# Patient Record
Sex: FEMALE | Race: WHITE | NOT HISPANIC OR LATINO | Employment: PART TIME | URBAN - METROPOLITAN AREA
[De-identification: names, ages, dates, MRNs, and addresses within clinical notes are randomized per-mention and may not be internally consistent; named-entity substitution may affect disease eponyms.]

---

## 2020-01-09 ENCOUNTER — OFFICE VISIT (OUTPATIENT)
Dept: OBSTETRICS AND GYNECOLOGY | Facility: CLINIC | Age: 20
End: 2020-01-09
Payer: COMMERCIAL

## 2020-01-09 VITALS — SYSTOLIC BLOOD PRESSURE: 112 MMHG | WEIGHT: 119.69 LBS | DIASTOLIC BLOOD PRESSURE: 78 MMHG

## 2020-01-09 DIAGNOSIS — N89.8 VAGINAL IRRITATION: ICD-10-CM

## 2020-01-09 DIAGNOSIS — Z01.419 ENCOUNTER FOR ANNUAL ROUTINE GYNECOLOGICAL EXAMINATION: Primary | ICD-10-CM

## 2020-01-09 PROCEDURE — 99999 PR PBB SHADOW E&M-NEW PATIENT-LVL II: ICD-10-PCS | Mod: PBBFAC,,, | Performed by: OBSTETRICS & GYNECOLOGY

## 2020-01-09 PROCEDURE — 87481 CANDIDA DNA AMP PROBE: CPT | Mod: 59

## 2020-01-09 PROCEDURE — 99385 PREV VISIT NEW AGE 18-39: CPT | Mod: S$GLB,,, | Performed by: OBSTETRICS & GYNECOLOGY

## 2020-01-09 PROCEDURE — 99385 PR PREVENTIVE VISIT,NEW,18-39: ICD-10-PCS | Mod: S$GLB,,, | Performed by: OBSTETRICS & GYNECOLOGY

## 2020-01-09 PROCEDURE — 99999 PR PBB SHADOW E&M-NEW PATIENT-LVL II: CPT | Mod: PBBFAC,,, | Performed by: OBSTETRICS & GYNECOLOGY

## 2020-01-09 RX ORDER — TRIAMCINOLONE ACETONIDE 1 MG/G
CREAM TOPICAL 2 TIMES DAILY
Qty: 30 G | Refills: 0 | Status: SHIPPED | OUTPATIENT
Start: 2020-01-09 | End: 2023-05-04

## 2020-01-09 RX ORDER — CLINDAMYCIN PHOSPHATE 11.9 MG/ML
SOLUTION TOPICAL
COMMUNITY
Start: 2019-12-12 | End: 2023-05-04

## 2020-01-09 RX ORDER — NYSTATIN 100000 U/G
CREAM TOPICAL 2 TIMES DAILY
Qty: 30 G | Refills: 0 | Status: SHIPPED | OUTPATIENT
Start: 2020-01-09 | End: 2023-05-04

## 2020-01-09 NOTE — PROGRESS NOTES
Chief Complaint   Patient presents with    Vaginal Itching     vaginal irritation       HISTORY OF PRESENT ILLNESS:   Aubrie Burks is a 19 y.o. female  g0 who presents for well woman exam.  Patient's last menstrual period was 2019..  She has complains of external itching and irritation last week that has resolved. Denies discharge. Denies contact with new exposures.  Cycles are regular. Declines STD testing. Declines birth control. Not sexually active. Unsure if had HPV vaccine.      History reviewed. No pertinent past medical history.     History reviewed. No pertinent surgical history.      Social History     Socioeconomic History    Marital status: Single     Spouse name: Not on file    Number of children: Not on file    Years of education: Not on file    Highest education level: Not on file   Occupational History    Not on file   Social Needs    Financial resource strain: Not on file    Food insecurity:     Worry: Not on file     Inability: Not on file    Transportation needs:     Medical: Not on file     Non-medical: Not on file   Tobacco Use    Smoking status: Never Smoker   Substance and Sexual Activity    Alcohol use: Never     Frequency: Never    Drug use: Never    Sexual activity: Never   Lifestyle    Physical activity:     Days per week: Not on file     Minutes per session: Not on file    Stress: Not on file   Relationships    Social connections:     Talks on phone: Not on file     Gets together: Not on file     Attends Sabianism service: Not on file     Active member of club or organization: Not on file     Attends meetings of clubs or organizations: Not on file     Relationship status: Not on file   Other Topics Concern    Not on file   Social History Narrative    Not on file       Family History   Problem Relation Age of Onset    Diabetes Mother          OB History    Para Term  AB Living   0 0 0 0 0 0   SAB TAB Ectopic Multiple Live Births   0 0 0 0 0          COMPREHENSIVE GYN HISTORY:  PAP History: Denies abnormal Paps  Infection History: Denies STDs. Denies PID.  Benign History:Denies uterine fibroids. Denies ovarian cysts. Denies endometriosis Denies other conditions.  Cancer History: Denies cervical cancer. Denies uterine cancer or hyperplasia. Denies ovarian cancer. Denies vulvar cancer or pre-cancer. Denies vaginal cancer or pre-cancer. Denies breast cancer. Denies colon cancer.  Cycle: 13/mon/5-6 days   Unsure about HPV vaccine    ROS:  GENERAL: Denies weight gain or weight loss. Feeling well overall.   SKIN: Denies rash or lesions.   HEAD: Denies headache.   NODES: Denies enlarged lymph nodes.   CHEST: Denies shortness of breath.   ABDOMEN: No abdominal pain, constipation, diarrhea, nausea, vomiting or rectal bleeding.   URINARY: No frequency, dysuria, hematuria, or burning on urination.  REPRODUCTIVE: See HPI.   BREASTS: The patient denies pain, lumps, or nipple discharge.       /78   Wt 54.3 kg (119 lb 11.4 oz)   LMP 12/24/2019     APPEARANCE: Well nourished, well developed, in no acute distress.  NECK: Neck symmetric without  thyromegaly.  NODES: No inguinal, cervical lymph node enlargement.  CHEST: Lungs clear to auscultation.  HEART: Regular rate and rhythm, no murmurs, rubs or gallops.  ABDOMEN: Soft. No tenderness or masses. No hernias. No hepatosplenomegaly.  BREASTS: Symmetrical, no skin changes or visible lesions. No palpable masses, nipple discharge or adenopathy bilaterally.  PELVIC:   VULVA: No lesions. Normal female genitalia. No erythema or rashes.   Internal vaginal exam not done         1. Encounter for annual routine gynecological examination    2. Vaginal irritation        Plan:  Routine gyn s/p normal breast exam. Pap not indicated until 21, HPV vaccine not in records so is going to ask parents/peds if had it. STD testing: GC/CT/trich, syphilis, HBV/HCV and HIV declined. Counseled on contraception and safe sex practices  anddeclines birth control today.   2. Likely contact dermitis, no signs of irritation or infection today, declines internal exam but wanted affirm so blind swab done, no obvious discharge.       F/u in 1 yr or PRN

## 2020-01-11 LAB
BACTERIAL VAGINOSIS DNA: NEGATIVE
CANDIDA GLABRATA DNA: NEGATIVE
CANDIDA KRUSEI DNA: NEGATIVE
CANDIDA RRNA VAG QL PROBE: NEGATIVE
T VAGINALIS RRNA GENITAL QL PROBE: NEGATIVE

## 2020-01-13 ENCOUNTER — TELEPHONE (OUTPATIENT)
Dept: OBSTETRICS AND GYNECOLOGY | Facility: CLINIC | Age: 20
End: 2020-01-13

## 2020-01-13 NOTE — TELEPHONE ENCOUNTER
Please let her know that her swab was negative for yeast and bacteria, it was likely something she came in contact with that cause the irritation or a yeast that resolved. If it comes back let us know.

## 2020-01-14 ENCOUNTER — TELEPHONE (OUTPATIENT)
Dept: OBSTETRICS AND GYNECOLOGY | Facility: CLINIC | Age: 20
End: 2020-01-14

## 2020-01-14 NOTE — TELEPHONE ENCOUNTER
----- Message from Briseida Domingo sent at 1/14/2020 12:12 PM CST -----  Pt can be reached at [phone]    Pt is calling to get her test results from swab.    Thank you!

## 2020-01-14 NOTE — TELEPHONE ENCOUNTER
Patient informed her vaginal swab for yeast and bv was negative  Per Dr Estes it is either something she came into contact with or resolved yeast  Patient verbalized understanding

## 2020-07-09 ENCOUNTER — LAB VISIT (OUTPATIENT)
Dept: PRIMARY CARE CLINIC | Facility: OTHER | Age: 20
End: 2020-07-09
Attending: INTERNAL MEDICINE
Payer: COMMERCIAL

## 2020-07-09 DIAGNOSIS — R05.9 COUGH: ICD-10-CM

## 2020-07-09 DIAGNOSIS — R43.9 PROBLEMS WITH SMELL AND TASTE: ICD-10-CM

## 2020-07-09 DIAGNOSIS — M79.10 MUSCLE PAIN: ICD-10-CM

## 2020-07-09 PROCEDURE — U0003 INFECTIOUS AGENT DETECTION BY NUCLEIC ACID (DNA OR RNA); SEVERE ACUTE RESPIRATORY SYNDROME CORONAVIRUS 2 (SARS-COV-2) (CORONAVIRUS DISEASE [COVID-19]), AMPLIFIED PROBE TECHNIQUE, MAKING USE OF HIGH THROUGHPUT TECHNOLOGIES AS DESCRIBED BY CMS-2020-01-R: HCPCS

## 2020-07-12 LAB — SARS-COV-2 RNA RESP QL NAA+PROBE: POSITIVE

## 2020-07-21 ENCOUNTER — LAB VISIT (OUTPATIENT)
Dept: PRIMARY CARE CLINIC | Facility: OTHER | Age: 20
End: 2020-07-21
Attending: INTERNAL MEDICINE
Payer: COMMERCIAL

## 2020-07-21 DIAGNOSIS — Z03.818 ENCOUNTER FOR OBSERVATION FOR SUSPECTED EXPOSURE TO OTHER BIOLOGICAL AGENTS RULED OUT: ICD-10-CM

## 2020-07-21 PROCEDURE — U0003 INFECTIOUS AGENT DETECTION BY NUCLEIC ACID (DNA OR RNA); SEVERE ACUTE RESPIRATORY SYNDROME CORONAVIRUS 2 (SARS-COV-2) (CORONAVIRUS DISEASE [COVID-19]), AMPLIFIED PROBE TECHNIQUE, MAKING USE OF HIGH THROUGHPUT TECHNOLOGIES AS DESCRIBED BY CMS-2020-01-R: HCPCS

## 2020-07-24 LAB — SARS-COV-2 RNA RESP QL NAA+PROBE: NEGATIVE

## 2021-11-25 ENCOUNTER — OFFICE VISIT (OUTPATIENT)
Dept: URGENT CARE | Facility: CLINIC | Age: 21
End: 2021-11-25
Payer: COMMERCIAL

## 2021-11-25 VITALS
TEMPERATURE: 100 F | RESPIRATION RATE: 19 BRPM | DIASTOLIC BLOOD PRESSURE: 84 MMHG | SYSTOLIC BLOOD PRESSURE: 126 MMHG | WEIGHT: 135 LBS | HEART RATE: 106 BPM | BODY MASS INDEX: 24.84 KG/M2 | HEIGHT: 62 IN | OXYGEN SATURATION: 99 %

## 2021-11-25 DIAGNOSIS — J10.1 INFLUENZA A: Primary | ICD-10-CM

## 2021-11-25 DIAGNOSIS — R50.9 FEVER, UNSPECIFIED FEVER CAUSE: ICD-10-CM

## 2021-11-25 LAB
CTP QC/QA: YES
CTP QC/QA: YES
POC MOLECULAR INFLUENZA A AGN: POSITIVE
POC MOLECULAR INFLUENZA B AGN: NEGATIVE
SARS-COV-2 RDRP RESP QL NAA+PROBE: NEGATIVE

## 2021-11-25 PROCEDURE — U0002: ICD-10-PCS | Mod: QW,S$GLB,, | Performed by: PHYSICIAN ASSISTANT

## 2021-11-25 PROCEDURE — 99203 PR OFFICE/OUTPT VISIT, NEW, LEVL III, 30-44 MIN: ICD-10-PCS | Mod: S$GLB,,, | Performed by: PHYSICIAN ASSISTANT

## 2021-11-25 PROCEDURE — U0002 COVID-19 LAB TEST NON-CDC: HCPCS | Mod: QW,S$GLB,, | Performed by: PHYSICIAN ASSISTANT

## 2021-11-25 PROCEDURE — 87502 POCT INFLUENZA A/B MOLECULAR: ICD-10-PCS | Mod: QW,S$GLB,, | Performed by: PHYSICIAN ASSISTANT

## 2021-11-25 PROCEDURE — 99203 OFFICE O/P NEW LOW 30 MIN: CPT | Mod: S$GLB,,, | Performed by: PHYSICIAN ASSISTANT

## 2021-11-25 PROCEDURE — 87502 INFLUENZA DNA AMP PROBE: CPT | Mod: QW,S$GLB,, | Performed by: PHYSICIAN ASSISTANT

## 2021-11-25 RX ORDER — OMEPRAZOLE 20 MG/1
1 CAPSULE, DELAYED RELEASE ORAL DAILY
COMMUNITY
Start: 2021-05-20 | End: 2023-05-04

## 2021-11-25 RX ORDER — OSELTAMIVIR PHOSPHATE 75 MG/1
75 CAPSULE ORAL 2 TIMES DAILY
Qty: 10 CAPSULE | Refills: 0 | Status: SHIPPED | OUTPATIENT
Start: 2021-11-25 | End: 2021-11-30

## 2021-11-25 RX ORDER — ONDANSETRON 4 MG/1
4 TABLET, ORALLY DISINTEGRATING ORAL EVERY 6 HOURS PRN
Qty: 12 TABLET | Refills: 0 | Status: SHIPPED | OUTPATIENT
Start: 2021-11-25 | End: 2023-05-04

## 2022-08-22 ENCOUNTER — PATIENT MESSAGE (OUTPATIENT)
Dept: INTERNAL MEDICINE | Facility: CLINIC | Age: 22
End: 2022-08-22
Payer: COMMERCIAL

## 2022-08-25 ENCOUNTER — TELEPHONE (OUTPATIENT)
Dept: INTERNAL MEDICINE | Facility: CLINIC | Age: 22
End: 2022-08-25
Payer: COMMERCIAL

## 2022-08-25 NOTE — TELEPHONE ENCOUNTER
----- Message from Allison Benz sent at 8/25/2022 10:04 AM CDT -----  Contact: Mom  Type:  Patient Returning Call    Who Called:Mom (jessica)  Would the patient rather a call back or a response via [employer]? Call back   Best Call Back Number: [phone]  Additional Information:     New pt  Mom would like her to est care  Pt states she suffers with anxiety   Caller stated she called last week 08/19 regarding this and no one has called

## 2023-02-19 ENCOUNTER — OFFICE VISIT (OUTPATIENT)
Dept: URGENT CARE | Facility: CLINIC | Age: 23
End: 2023-02-19
Payer: COMMERCIAL

## 2023-02-19 VITALS
BODY MASS INDEX: 24.84 KG/M2 | DIASTOLIC BLOOD PRESSURE: 87 MMHG | TEMPERATURE: 98 F | SYSTOLIC BLOOD PRESSURE: 129 MMHG | WEIGHT: 135 LBS | RESPIRATION RATE: 19 BRPM | HEART RATE: 80 BPM | OXYGEN SATURATION: 97 % | HEIGHT: 62 IN

## 2023-02-19 DIAGNOSIS — H66.92 LEFT OTITIS MEDIA, UNSPECIFIED OTITIS MEDIA TYPE: ICD-10-CM

## 2023-02-19 DIAGNOSIS — S39.012A STRAIN OF LUMBAR REGION, INITIAL ENCOUNTER: ICD-10-CM

## 2023-02-19 DIAGNOSIS — J06.9 UPPER RESPIRATORY TRACT INFECTION, UNSPECIFIED TYPE: Primary | ICD-10-CM

## 2023-02-19 LAB
CTP QC/QA: YES
SARS-COV-2 AG RESP QL IA.RAPID: NEGATIVE

## 2023-02-19 PROCEDURE — 1160F PR REVIEW ALL MEDS BY PRESCRIBER/CLIN PHARMACIST DOCUMENTED: ICD-10-PCS | Mod: CPTII,S$GLB,,

## 2023-02-19 PROCEDURE — 3079F PR MOST RECENT DIASTOLIC BLOOD PRESSURE 80-89 MM HG: ICD-10-PCS | Mod: CPTII,S$GLB,,

## 2023-02-19 PROCEDURE — 3074F PR MOST RECENT SYSTOLIC BLOOD PRESSURE < 130 MM HG: ICD-10-PCS | Mod: CPTII,S$GLB,,

## 2023-02-19 PROCEDURE — 3008F PR BODY MASS INDEX (BMI) DOCUMENTED: ICD-10-PCS | Mod: CPTII,S$GLB,,

## 2023-02-19 PROCEDURE — 3008F BODY MASS INDEX DOCD: CPT | Mod: CPTII,S$GLB,,

## 2023-02-19 PROCEDURE — 1160F RVW MEDS BY RX/DR IN RCRD: CPT | Mod: CPTII,S$GLB,,

## 2023-02-19 PROCEDURE — 99214 OFFICE O/P EST MOD 30 MIN: CPT | Mod: S$GLB,,,

## 2023-02-19 PROCEDURE — 1159F MED LIST DOCD IN RCRD: CPT | Mod: CPTII,S$GLB,,

## 2023-02-19 PROCEDURE — 99214 PR OFFICE/OUTPT VISIT, EST, LEVL IV, 30-39 MIN: ICD-10-PCS | Mod: S$GLB,,,

## 2023-02-19 PROCEDURE — 1159F PR MEDICATION LIST DOCUMENTED IN MEDICAL RECORD: ICD-10-PCS | Mod: CPTII,S$GLB,,

## 2023-02-19 PROCEDURE — 87811 SARS CORONAVIRUS 2 ANTIGEN POCT, MANUAL READ: ICD-10-PCS | Mod: QW,S$GLB,,

## 2023-02-19 PROCEDURE — 3074F SYST BP LT 130 MM HG: CPT | Mod: CPTII,S$GLB,,

## 2023-02-19 PROCEDURE — 87811 SARS-COV-2 COVID19 W/OPTIC: CPT | Mod: QW,S$GLB,,

## 2023-02-19 PROCEDURE — 3079F DIAST BP 80-89 MM HG: CPT | Mod: CPTII,S$GLB,,

## 2023-02-19 RX ORDER — CYCLOBENZAPRINE HCL 5 MG
5 TABLET ORAL 3 TIMES DAILY PRN
Qty: 15 TABLET | Refills: 0 | Status: SHIPPED | OUTPATIENT
Start: 2023-02-19 | End: 2023-03-01

## 2023-02-19 RX ORDER — BENZONATATE 100 MG/1
200 CAPSULE ORAL 3 TIMES DAILY PRN
Qty: 30 CAPSULE | Refills: 0 | Status: SHIPPED | OUTPATIENT
Start: 2023-02-19 | End: 2023-03-01

## 2023-02-19 RX ORDER — FLUTICASONE PROPIONATE 50 MCG
1 SPRAY, SUSPENSION (ML) NASAL DAILY
Qty: 9.9 ML | Refills: 0 | Status: SHIPPED | OUTPATIENT
Start: 2023-02-19 | End: 2023-05-04

## 2023-02-19 RX ORDER — LORATADINE 10 MG/1
10 TABLET ORAL DAILY
Qty: 30 TABLET | Refills: 2 | Status: SHIPPED | OUTPATIENT
Start: 2023-02-19 | End: 2023-05-04

## 2023-02-19 RX ORDER — AMOXICILLIN AND CLAVULANATE POTASSIUM 875; 125 MG/1; MG/1
1 TABLET, FILM COATED ORAL EVERY 12 HOURS
Qty: 14 TABLET | Refills: 0 | Status: SHIPPED | OUTPATIENT
Start: 2023-02-19 | End: 2023-02-26

## 2023-02-19 NOTE — LETTER
February 19, 2023      [facility]  [address] [address]  Phone: [phone]  Fax: [phone]       Patient: Aubrie Burks   YOB: 2000  Date of Visit: 02/19/2023    To Whom It May Concern:    Jose Angel Burks  was at [facility] on 02/19/2023. The patient may return to work/school on 02/24/2023 with no restrictions. If you have any questions or concerns, or if I can be of further assistance, please do not hesitate to contact me.    Sincerely,    Kathy Mckeon, NP

## 2023-02-19 NOTE — PROGRESS NOTES
"Subjective:       Patient ID: uAbrie Burks is a 22 y.o. female.    Vitals:  height is 5' 2" (1.575 m) and weight is 61.2 kg (135 lb). Her temperature is 98.4 °F (36.9 °C). Her blood pressure is 129/87 and her pulse is 80. Her respiration is 19 and oxygen saturation is 97%.     Chief Complaint: Cough and Sinus Problem    Pt present with symptoms of cough, congestion, sinus pressure, ear pain and sore throat X 3 weeks.     Pt stated she also feels like she pulled a muscle in her lower back. Pt reports that the pain feels muscular and started hurting after she had a coughing fit. Back pain started a couple of days ago.    Cough  This is a new problem. The current episode started 1 to 4 weeks ago. The problem has been gradually worsening. The problem occurs every few minutes. The cough is Non-productive. Associated symptoms include nasal congestion and a sore throat. Pertinent negatives include no chills, ear pain, fever or headaches. Nothing aggravates the symptoms. She has tried OTC cough suppressant for the symptoms. The treatment provided mild relief. There is no history of asthma or bronchitis.     Constitution: Negative for chills and fever.   HENT:  Positive for sore throat. Negative for ear pain.    Respiratory:  Positive for cough.    Genitourinary:  Negative for dysuria, frequency and urgency.   Neurological:  Negative for headaches.     Objective:      Physical Exam   Constitutional: She is oriented to person, place, and time. She appears well-developed. She is cooperative.  Non-toxic appearance. She does not appear ill. No distress.   HENT:   Head: Normocephalic and atraumatic.   Ears:   Right Ear: Hearing, tympanic membrane, external ear and ear canal normal.   Left Ear: Hearing, tympanic membrane, external ear and ear canal normal.   Nose: Congestion present. No mucosal edema, rhinorrhea or nasal deformity. No epistaxis. Right sinus exhibits no maxillary sinus tenderness and no frontal sinus tenderness. " Left sinus exhibits no maxillary sinus tenderness and no frontal sinus tenderness.   Mouth/Throat: Uvula is midline and mucous membranes are normal. No trismus in the jaw. Normal dentition. No uvula swelling. Posterior oropharyngeal erythema present. No oropharyngeal exudate or posterior oropharyngeal edema.   Eyes: Conjunctivae and lids are normal. No scleral icterus.   Neck: Trachea normal and phonation normal. Neck supple. No edema present. No erythema present. No neck rigidity present.   Cardiovascular: Normal rate, regular rhythm, normal heart sounds and normal pulses.   Pulmonary/Chest: Effort normal and breath sounds normal. No respiratory distress. She has no decreased breath sounds. She has no rhonchi.   Abdominal: Normal appearance and bowel sounds are normal. She exhibits no mass. Soft.   Musculoskeletal: Normal range of motion.         General: No deformity. Normal range of motion.      Thoracic back: Normal.      Lumbar back: She exhibits tenderness. She exhibits normal range of motion, no bony tenderness, no swelling and no edema.        Back:       Comments: TTP to lumbar spine, full ROM, no swelling, erythema or bruising   Neurological: She is alert and oriented to person, place, and time. She has normal strength and normal reflexes. No sensory deficit. She exhibits normal muscle tone. Coordination normal.   Skin: Skin is warm, dry, intact, not diaphoretic and not pale.   Psychiatric: Her speech is normal and behavior is normal. Judgment and thought content normal.   Nursing note and vitals reviewed.      Results for orders placed or performed in visit on 02/19/23   SARS Coronavirus 2 Antigen, POCT Manual Read   Result Value Ref Range    SARS Coronavirus 2 Antigen Negative Negative     Acceptable Yes        Assessment:       1. Upper respiratory tract infection, unspecified type    2. Acute bilateral low back pain without sciatica    3. Left otitis media, unspecified otitis media type           Plan:         Upper respiratory tract infection, unspecified type  -     SARS Coronavirus 2 Antigen, POCT Manual Read  -     benzonatate (TESSALON) 100 MG capsule; Take 2 capsules (200 mg total) by mouth 3 (three) times daily as needed for Cough.  Dispense: 30 capsule; Refill: 0  -     fluticasone propionate (FLONASE) 50 mcg/actuation nasal spray; 1 spray (50 mcg total) by Each Nostril route once daily.  Dispense: 9.9 mL; Refill: 0  -     loratadine (CLARITIN) 10 mg tablet; Take 1 tablet (10 mg total) by mouth once daily.  Dispense: 30 tablet; Refill: 2    Acute bilateral low back pain without sciatica  -     cyclobenzaprine (FLEXERIL) 5 MG tablet; Take 1 tablet (5 mg total) by mouth 3 (three) times daily as needed for Muscle spasms.  Dispense: 15 tablet; Refill: 0    Left otitis media, unspecified otitis media type  -     amoxicillin-clavulanate 875-125mg (AUGMENTIN) 875-125 mg per tablet; Take 1 tablet by mouth every 12 (twelve) hours. for 7 days  Dispense: 14 tablet; Refill: 0    Pt in no acute distress. Vitals reassuring. Reviewed negative COVID-19 test. Discussed OTC medications for URI. Augmentin prescribed for left otitis media. Flexeril for lumbar strain. Discussed the importance of further evaluation if symptoms worsen. Patient stated verbal understanding.       Patient Instructions     Patient Instructions   PLEASE READ YOUR DISCHARGE INSTRUCTIONS ENTIRELY AS IT CONTAINS IMPORTANT INFORMATION.     Please drink plenty of fluids.     Please get plenty of rest.     Please return here or go to the Emergency Department for any concerns or worsening of condition.     Please take an over the counter antihistamine medication (allegra/Claritin/Zyrtec) of your choice as directed.     Try an over the counter decongestant like Mucinex D or Sudafed. You buy this behind the pharmacy counter     If you do have Hypertension or palpitations, it is safe to take Coricidin HBP for relief of sinus symptoms.     If not  allergic, please take over the counter Tylenol (Acetaminophen) and/or Motrin (Ibuprofen) as directed for control of pain and/or fever.  Please follow up with your primary care doctor or specialist as needed.     Sore throat recommendations: Warm fluids, warm salt water gargles, throat lozenges, tea, honey, soup, rest, hydration.     Use over the counter flonase: one spray each nostril twice daily OR two sprays each nostril once daily.      If you  smoke, please stop smoking.     Please return or see your primary care doctor if you develop new or worsening symptoms.      Please arrange follow up with your primary medical clinic as soon as possible. You must understand that you've received an Urgent Care treatment only and that you may be released before all of your medical problems are known or treated. You, the patient, will arrange for follow up as instructed. If your symptoms worsen or fail to improve you should go to the Emergency Room.    Ear Infection:  Take full course of antibiotics as prescribed.  Humidifier use at home.  Warm compresses to affected ear  Elevate head on a pillow at night   Flonase Nasal Spray as directed for nasal congestion  Over the Counter Claritin or Zyrtec (plain) as directed for allergy symptoms  Tylenol or Motrin every 4 - 6 hours as needed for fever or ear pain.  Follow up with your PCP in 1 week of initiating antibiotics or sooner for no improvement in symptoms  Follow up in the ER for any worsening of symptoms such as new fever, increasing ear pain, neck stiffness, shortness of breath, etc.  If you smoke, stop smoking.    PLEASE READ YOUR DISCHARGE INSTRUCTIONS ENTIRELY AS IT CONTAINS IMPORTANT INFORMATION.  Please drink plenty of fluids.  Please get plenty of rest.  Ice to the area.   You may do gently stretching if tolerable.    Please return here or go to the Emergency Department for any concerns or worsening of condition.    If you were prescribed a narcotic medication or  muscle relaxer (Flexeril), do not drive or operate heavy equipment or machinery while taking these medications. Take a half first to see how it affects you    If you were not prescribed an anti-inflammatory medication, and if you do not have any history of stomach/intestinal ulcers, or kidney disease, or are not taking a blood thinner such as Coumadin, Plavix, Pradaxa, Eloquis, or Xaralta for example, it is OK to take over the counter Ibuprofen or Advil or Motrin or Aleve as directed.  Do not take these medications on an empty stomach.    If you lose control of your bowel and/or bladder, please go to the nearest Emergency Department immediately.    If you lose sensation in between your legs by your genitalia and/or rectum, please go to the nearest Emergency Department immediately.    If you lose control or sensation of any extremity, please go to the nearest Emergency Department immediately.    Do not stay in one position to long.  When sleeping on your back place a pillow under knees to reduce tension on back.  If sleeping on your side, place pillow between knees to keep spine in better alinement.  Wear supportive shoes such as tennis shoes for support of the lower back.  Take any medication as directed.    Please follow up with your primary care doctor or specialist as needed.    If you  smoke, please stop smoking.    Please arrange follow up with your primary medical clinic as soon as possible. You must understand that you've received an Urgent Care treatment only and that you may be released before all of your medical problems are known or treated. You, the patient, will arrange for follow up as instructed. If your symptoms worsen or fail to improve you should go to the Emergency Room.

## 2023-02-19 NOTE — PATIENT INSTRUCTIONS
Patient Instructions   PLEASE READ YOUR DISCHARGE INSTRUCTIONS ENTIRELY AS IT CONTAINS IMPORTANT INFORMATION.     Please drink plenty of fluids.     Please get plenty of rest.     Please return here or go to the Emergency Department for any concerns or worsening of condition.     Please take an over the counter antihistamine medication (allegra/Claritin/Zyrtec) of your choice as directed.     Try an over the counter decongestant like Mucinex D or Sudafed. You buy this behind the pharmacy counter     If you do have Hypertension or palpitations, it is safe to take Coricidin HBP for relief of sinus symptoms.     If not allergic, please take over the counter Tylenol (Acetaminophen) and/or Motrin (Ibuprofen) as directed for control of pain and/or fever.  Please follow up with your primary care doctor or specialist as needed.     Sore throat recommendations: Warm fluids, warm salt water gargles, throat lozenges, tea, honey, soup, rest, hydration.     Use over the counter flonase: one spray each nostril twice daily OR two sprays each nostril once daily.      If you  smoke, please stop smoking.     Please return or see your primary care doctor if you develop new or worsening symptoms.      Please arrange follow up with your primary medical clinic as soon as possible. You must understand that you've received an Urgent Care treatment only and that you may be released before all of your medical problems are known or treated. You, the patient, will arrange for follow up as instructed. If your symptoms worsen or fail to improve you should go to the Emergency Room.    Ear Infection:  Take full course of antibiotics as prescribed.  Humidifier use at home.  Warm compresses to affected ear  Elevate head on a pillow at night   Flonase Nasal Spray as directed for nasal congestion  Over the Counter Claritin or Zyrtec (plain) as directed for allergy symptoms  Tylenol or Motrin every 4 - 6 hours as needed for fever or ear pain.  Follow  up with your PCP in 1 week of initiating antibiotics or sooner for no improvement in symptoms  Follow up in the ER for any worsening of symptoms such as new fever, increasing ear pain, neck stiffness, shortness of breath, etc.  If you smoke, stop smoking.    PLEASE READ YOUR DISCHARGE INSTRUCTIONS ENTIRELY AS IT CONTAINS IMPORTANT INFORMATION.  Please drink plenty of fluids.  Please get plenty of rest.  Ice to the area.   You may do gently stretching if tolerable.    Please return here or go to the Emergency Department for any concerns or worsening of condition.    If you were prescribed a narcotic medication or muscle relaxer (Flexeril), do not drive or operate heavy equipment or machinery while taking these medications. Take a half first to see how it affects you    If you were not prescribed an anti-inflammatory medication, and if you do not have any history of stomach/intestinal ulcers, or kidney disease, or are not taking a blood thinner such as Coumadin, Plavix, Pradaxa, Eloquis, or Xaralta for example, it is OK to take over the counter Ibuprofen or Advil or Motrin or Aleve as directed.  Do not take these medications on an empty stomach.    If you lose control of your bowel and/or bladder, please go to the nearest Emergency Department immediately.    If you lose sensation in between your legs by your genitalia and/or rectum, please go to the nearest Emergency Department immediately.    If you lose control or sensation of any extremity, please go to the nearest Emergency Department immediately.    Do not stay in one position to long.  When sleeping on your back place a pillow under knees to reduce tension on back.  If sleeping on your side, place pillow between knees to keep spine in better alinement.  Wear supportive shoes such as tennis shoes for support of the lower back.  Take any medication as directed.    Please follow up with your primary care doctor or specialist as needed.    If you  smoke, please stop  smoking.    Please arrange follow up with your primary medical clinic as soon as possible. You must understand that you've received an Urgent Care treatment only and that you may be released before all of your medical problems are known or treated. You, the patient, will arrange for follow up as instructed. If your symptoms worsen or fail to improve you should go to the Emergency Room.

## 2023-05-04 ENCOUNTER — OFFICE VISIT (OUTPATIENT)
Dept: INTERNAL MEDICINE | Facility: CLINIC | Age: 23
End: 2023-05-04
Payer: COMMERCIAL

## 2023-05-04 ENCOUNTER — TELEPHONE (OUTPATIENT)
Dept: INTERNAL MEDICINE | Facility: CLINIC | Age: 23
End: 2023-05-04

## 2023-05-04 VITALS
WEIGHT: 135.38 LBS | SYSTOLIC BLOOD PRESSURE: 126 MMHG | OXYGEN SATURATION: 100 % | DIASTOLIC BLOOD PRESSURE: 64 MMHG | HEIGHT: 65 IN | HEART RATE: 77 BPM | BODY MASS INDEX: 22.56 KG/M2

## 2023-05-04 DIAGNOSIS — Z76.89 ENCOUNTER TO ESTABLISH CARE: ICD-10-CM

## 2023-05-04 DIAGNOSIS — F41.1 GENERALIZED ANXIETY DISORDER: ICD-10-CM

## 2023-05-04 PROCEDURE — 99999 PR PBB SHADOW E&M-EST. PATIENT-LVL IV: ICD-10-PCS | Mod: PBBFAC,,, | Performed by: INTERNAL MEDICINE

## 2023-05-04 PROCEDURE — 99203 PR OFFICE/OUTPT VISIT, NEW, LEVL III, 30-44 MIN: ICD-10-PCS | Mod: S$GLB,,, | Performed by: INTERNAL MEDICINE

## 2023-05-04 PROCEDURE — 1159F PR MEDICATION LIST DOCUMENTED IN MEDICAL RECORD: ICD-10-PCS | Mod: CPTII,S$GLB,, | Performed by: INTERNAL MEDICINE

## 2023-05-04 PROCEDURE — 99999 PR PBB SHADOW E&M-EST. PATIENT-LVL IV: CPT | Mod: PBBFAC,,, | Performed by: INTERNAL MEDICINE

## 2023-05-04 PROCEDURE — 99203 OFFICE O/P NEW LOW 30 MIN: CPT | Mod: S$GLB,,, | Performed by: INTERNAL MEDICINE

## 2023-05-04 PROCEDURE — 3074F SYST BP LT 130 MM HG: CPT | Mod: CPTII,S$GLB,, | Performed by: INTERNAL MEDICINE

## 2023-05-04 PROCEDURE — 3078F PR MOST RECENT DIASTOLIC BLOOD PRESSURE < 80 MM HG: ICD-10-PCS | Mod: CPTII,S$GLB,, | Performed by: INTERNAL MEDICINE

## 2023-05-04 PROCEDURE — 3008F PR BODY MASS INDEX (BMI) DOCUMENTED: ICD-10-PCS | Mod: CPTII,S$GLB,, | Performed by: INTERNAL MEDICINE

## 2023-05-04 PROCEDURE — 3078F DIAST BP <80 MM HG: CPT | Mod: CPTII,S$GLB,, | Performed by: INTERNAL MEDICINE

## 2023-05-04 PROCEDURE — 1160F PR REVIEW ALL MEDS BY PRESCRIBER/CLIN PHARMACIST DOCUMENTED: ICD-10-PCS | Mod: CPTII,S$GLB,, | Performed by: INTERNAL MEDICINE

## 2023-05-04 PROCEDURE — 3074F PR MOST RECENT SYSTOLIC BLOOD PRESSURE < 130 MM HG: ICD-10-PCS | Mod: CPTII,S$GLB,, | Performed by: INTERNAL MEDICINE

## 2023-05-04 PROCEDURE — 3008F BODY MASS INDEX DOCD: CPT | Mod: CPTII,S$GLB,, | Performed by: INTERNAL MEDICINE

## 2023-05-04 PROCEDURE — 1159F MED LIST DOCD IN RCRD: CPT | Mod: CPTII,S$GLB,, | Performed by: INTERNAL MEDICINE

## 2023-05-04 PROCEDURE — 1160F RVW MEDS BY RX/DR IN RCRD: CPT | Mod: CPTII,S$GLB,, | Performed by: INTERNAL MEDICINE

## 2023-05-04 RX ORDER — SERTRALINE HYDROCHLORIDE 25 MG/1
TABLET, FILM COATED ORAL
Qty: 30 TABLET | Refills: 3 | Status: SHIPPED | OUTPATIENT
Start: 2023-05-04

## 2023-05-04 NOTE — PROGRESS NOTES
Patient ID: Aubrie Burks is a 22 y.o. female.    Chief Complaint: Establish Care    HPI Aubrie is a 22 y.o. female with no significant PMH who presents to establish [facility] and with chief complaint of anxiety. Presents with her mother today. She has always felt anxious. She is currently in therapy. She wishes to pursue medication for treatment of this. No further complaints/concerns. She declines to do labs today but may consider in the future.     During exam, reports getting bumps in armpits and groin area. They are soft and mobile, sometimes painful. She does not have any now though.     Health Maintenance Topics with due status: Not Due       Topic Last Completion Date    Influenza Vaccine Not Due      Review of Systems   Psychiatric/Behavioral:  The patient is nervous/anxious.    All other systems reviewed and are negative.      Objective:     Vitals:    05/04/23 0845   BP: 126/64   Pulse: 77        Physical Exam  Vitals reviewed.   Constitutional:       General: She is not in acute distress.     Appearance: Normal appearance. She is well-developed. She is not ill-appearing, toxic-appearing or diaphoretic.   HENT:      Head: Normocephalic and atraumatic.      Right Ear: Tympanic membrane, ear canal and external ear normal. There is no impacted cerumen.      Left Ear: Tympanic membrane, ear canal and external ear normal. There is no impacted cerumen.      Nose: Nose normal.   Eyes:      General: No scleral icterus.        Right eye: No discharge.         Left eye: No discharge.      Extraocular Movements: Extraocular movements intact.      Conjunctiva/sclera: Conjunctivae normal.   Neck:      Thyroid: No thyromegaly.      Trachea: No tracheal deviation.   Cardiovascular:      Rate and Rhythm: Normal rate and regular rhythm.      Pulses: Normal pulses.      Heart sounds: Normal heart sounds. No murmur heard.    No friction rub. No gallop.   Pulmonary:      Effort: Pulmonary effort is normal. No respiratory  distress.      Breath sounds: Normal breath sounds. No stridor. No wheezing, rhonchi or rales.   Chest:      Chest wall: No tenderness.   Abdominal:      General: Bowel sounds are normal. There is no distension.      Palpations: Abdomen is soft. There is no mass.      Tenderness: There is no abdominal tenderness. There is no guarding or rebound.      Hernia: No hernia is present.   Musculoskeletal:         General: No tenderness or deformity.      Cervical back: Neck supple.      Right lower leg: No edema.      Left lower leg: No edema.   Lymphadenopathy:      Cervical: No cervical adenopathy.   Skin:     General: Skin is warm and dry.      Findings: No erythema or rash.   Neurological:      General: No focal deficit present.      Mental Status: She is alert and oriented to person, place, and time. Mental status is at baseline.   Psychiatric:         Mood and Affect: Mood normal.         Behavior: Behavior normal.         Thought Content: Thought content normal.         Judgment: Judgment normal.       Assessment:       1. Encounter to establish care    2. Generalized anxiety disorder Chronic       Plan:     Suspect patient is describing recurrent sebaceous cysts. Encouraged her to let me know when they occur in the future and will try to get her into clinic for appt at that time to evaluate.     Encounter to establish care    Generalized anxiety disorder  Comments:  PATRICIA-7 score 16 pts=severe anxiety. Start zoloft. Cont counseling.   Orders:  -     sertraline (ZOLOFT) 25 MG tablet; Take 1/2 tablet PO daily for 7 days and then increase to 1 whole tablet PO daily thereafter  Dispense: 30 tablet; Refill: 3        RTC 6-8 weeks     Warning signs discussed, patient to call with any further issues or worsening of symptoms.       Parts of the above note were dictated using a voice dictation software. Please excuse any grammatical or typographical errors.

## 2023-06-03 ENCOUNTER — PATIENT MESSAGE (OUTPATIENT)
Dept: INTERNAL MEDICINE | Facility: CLINIC | Age: 23
End: 2023-06-03
Payer: COMMERCIAL

## 2023-06-09 ENCOUNTER — OFFICE VISIT (OUTPATIENT)
Dept: OBSTETRICS AND GYNECOLOGY | Facility: CLINIC | Age: 23
End: 2023-06-09
Payer: COMMERCIAL

## 2023-06-09 VITALS
BODY MASS INDEX: 22.82 KG/M2 | SYSTOLIC BLOOD PRESSURE: 114 MMHG | DIASTOLIC BLOOD PRESSURE: 74 MMHG | WEIGHT: 135.06 LBS

## 2023-06-09 DIAGNOSIS — Z01.419 ENCOUNTER FOR ANNUAL ROUTINE GYNECOLOGICAL EXAMINATION: Primary | ICD-10-CM

## 2023-06-09 DIAGNOSIS — Z12.4 PAP SMEAR FOR CERVICAL CANCER SCREENING: ICD-10-CM

## 2023-06-09 PROCEDURE — 3078F DIAST BP <80 MM HG: CPT | Mod: CPTII,S$GLB,, | Performed by: OBSTETRICS & GYNECOLOGY

## 2023-06-09 PROCEDURE — 99999 PR PBB SHADOW E&M-EST. PATIENT-LVL III: CPT | Mod: PBBFAC,,, | Performed by: OBSTETRICS & GYNECOLOGY

## 2023-06-09 PROCEDURE — 3074F PR MOST RECENT SYSTOLIC BLOOD PRESSURE < 130 MM HG: ICD-10-PCS | Mod: CPTII,S$GLB,, | Performed by: OBSTETRICS & GYNECOLOGY

## 2023-06-09 PROCEDURE — 3008F BODY MASS INDEX DOCD: CPT | Mod: CPTII,S$GLB,, | Performed by: OBSTETRICS & GYNECOLOGY

## 2023-06-09 PROCEDURE — 3008F PR BODY MASS INDEX (BMI) DOCUMENTED: ICD-10-PCS | Mod: CPTII,S$GLB,, | Performed by: OBSTETRICS & GYNECOLOGY

## 2023-06-09 PROCEDURE — 99395 PREV VISIT EST AGE 18-39: CPT | Mod: S$GLB,,, | Performed by: OBSTETRICS & GYNECOLOGY

## 2023-06-09 PROCEDURE — 3074F SYST BP LT 130 MM HG: CPT | Mod: CPTII,S$GLB,, | Performed by: OBSTETRICS & GYNECOLOGY

## 2023-06-09 PROCEDURE — 99395 PR PREVENTIVE VISIT,EST,18-39: ICD-10-PCS | Mod: S$GLB,,, | Performed by: OBSTETRICS & GYNECOLOGY

## 2023-06-09 PROCEDURE — 88175 CYTOPATH C/V AUTO FLUID REDO: CPT | Performed by: OBSTETRICS & GYNECOLOGY

## 2023-06-09 PROCEDURE — 1159F MED LIST DOCD IN RCRD: CPT | Mod: CPTII,S$GLB,, | Performed by: OBSTETRICS & GYNECOLOGY

## 2023-06-09 PROCEDURE — 3078F PR MOST RECENT DIASTOLIC BLOOD PRESSURE < 80 MM HG: ICD-10-PCS | Mod: CPTII,S$GLB,, | Performed by: OBSTETRICS & GYNECOLOGY

## 2023-06-09 PROCEDURE — 1159F PR MEDICATION LIST DOCUMENTED IN MEDICAL RECORD: ICD-10-PCS | Mod: CPTII,S$GLB,, | Performed by: OBSTETRICS & GYNECOLOGY

## 2023-06-09 PROCEDURE — 99999 PR PBB SHADOW E&M-EST. PATIENT-LVL III: ICD-10-PCS | Mod: PBBFAC,,, | Performed by: OBSTETRICS & GYNECOLOGY

## 2023-06-09 NOTE — PROGRESS NOTES
Chief Complaint   Patient presents with    Annual Exam       HISTORY OF PRESENT ILLNESS:   Aubrie Burks is a 22 y.o. female  g0 who presents for well woman exam.  No LMP recorded..    Cycles are regular. Declines STD testing. Declines birth control. Unsure if had HPV vaccine. Has noticed some larger lumps in the vulvar area, mostly after shaving, never drains and thinks they could be lymph nodes, doesn't feel them now.      Past Medical History:   Diagnosis Date    Generalized anxiety disorder         History reviewed. No pertinent surgical history.        Social History     Socioeconomic History    Marital status: Single   Tobacco Use    Smoking status: Never    Smokeless tobacco: Never   Substance and Sexual Activity    Alcohol use: Yes     Comment: socially    Drug use: Never    Sexual activity: Not Currently       Family History   Problem Relation Age of Onset    Diabetes Mother     Esophageal cancer Maternal Grandmother     Hypertension Maternal Grandfather     Multiple myeloma Maternal Grandfather            OB History    Para Term  AB Living   0 0 0 0 0 0   SAB IAB Ectopic Multiple Live Births   0 0 0 0 0         COMPREHENSIVE GYN HISTORY:  PAP History: Denies abnormal Paps  Infection History: Denies STDs. Denies PID.  Benign History:Denies uterine fibroids. Denies ovarian cysts. Denies endometriosis Denies other conditions.  Cancer History: Denies cervical cancer. Denies uterine cancer or hyperplasia. Denies ovarian cancer. Denies vulvar cancer or pre-cancer. Denies vaginal cancer or pre-cancer. Denies breast cancer. Denies colon cancer.  Cycle: /5-6 days   Unsure about HPV vaccine    ROS:  Negative       Wt 61.3 kg (135 lb 0.5 oz)   BMI 22.82 kg/m²     APPEARANCE: Well nourished, well developed, in no acute distress.  NECK: Neck symmetric   ABDOMEN: Soft. No tenderness or masses. No hernias. No hepatosplenomegaly.  BREASTS: Symmetrical, no skin changes or visible lesions. No palpable  masses, nipple discharge or adenopathy bilaterally.  PELVIC:   VULVA: No lesions. Normal female genitalia. No erythema or rashes.   Normal appearing vaginal mucosa, normal cervix   Normal sized uterus and adnexa with no masses appreciated        1. Encounter for annual routine gynecological examination    2. Pap smear for cervical cancer screening          Plan:  Routine gyn s/p normal breast exam. Pap done today, HPV vaccine not in records so is going to ask parents/peds if had it. STD testing: GC/CT/trich, syphilis, HBV/HCV and HIV declined. Counseled on contraception and safe sex practices and declines  birth control today.   2. No lymphadenopathy appreciated, will monitor and come back if happens again so we can evaluate it.       F/u in 1 yr or PRN

## 2023-06-16 LAB
FINAL PATHOLOGIC DIAGNOSIS: NORMAL
Lab: NORMAL

## 2023-06-19 ENCOUNTER — PATIENT MESSAGE (OUTPATIENT)
Dept: OBSTETRICS AND GYNECOLOGY | Facility: CLINIC | Age: 23
End: 2023-06-19
Payer: COMMERCIAL

## 2023-11-30 ENCOUNTER — PATIENT MESSAGE (OUTPATIENT)
Dept: OBSTETRICS AND GYNECOLOGY | Facility: CLINIC | Age: 23
End: 2023-11-30
Payer: COMMERCIAL

## 2023-12-01 NOTE — TELEPHONE ENCOUNTER
Left voicemail retuning patient phone call regarding making an appointment and to get more information.

## 2023-12-12 ENCOUNTER — TELEPHONE (OUTPATIENT)
Dept: OBSTETRICS AND GYNECOLOGY | Facility: CLINIC | Age: 23
End: 2023-12-12
Payer: COMMERCIAL

## 2023-12-12 NOTE — TELEPHONE ENCOUNTER
----- Message from Meenu Myers MD sent at 12/12/2023  7:47 AM CST -----  Contact: Pt    ----- Message -----  From: Dayo Whatley  Sent: 12/12/2023   7:42 AM CST  To: Lucia [facility] Staff    .Type:  Needs Medical Advice    Who Called: pt  Symptoms (please be specific):  UTI   How long has patient had these symptoms:  2 weeks  Pharmacy name and phone #:  [facility] #[id number] - [place] - [address] AT [facility] & [address]   Phone: [phone]  Fax: [phone]  Would the patient rather a call back or a response via [facility]?  Call back  Best Call Back Number: [phone]  Additional Information: Pt. Is requesting something be called in for her because she can not get in to see the provider and also would like to be tested for STI/STD.

## 2023-12-12 NOTE — TELEPHONE ENCOUNTER
No burning with urination,does have discharge issue, has rash as well as swelling of the labia's. Got her an appointment with Dr. Freire

## 2023-12-15 ENCOUNTER — OFFICE VISIT (OUTPATIENT)
Dept: OBSTETRICS AND GYNECOLOGY | Facility: CLINIC | Age: 23
End: 2023-12-15
Payer: COMMERCIAL

## 2023-12-15 ENCOUNTER — LAB VISIT (OUTPATIENT)
Dept: LAB | Facility: HOSPITAL | Age: 23
End: 2023-12-15
Payer: COMMERCIAL

## 2023-12-15 VITALS
WEIGHT: 133.81 LBS | SYSTOLIC BLOOD PRESSURE: 125 MMHG | DIASTOLIC BLOOD PRESSURE: 78 MMHG | BODY MASS INDEX: 22.62 KG/M2

## 2023-12-15 DIAGNOSIS — Z11.3 ROUTINE SCREENING FOR STI (SEXUALLY TRANSMITTED INFECTION): ICD-10-CM

## 2023-12-15 DIAGNOSIS — N89.8 VAGINAL DISCHARGE: Primary | ICD-10-CM

## 2023-12-15 LAB — HIV 1+2 AB+HIV1 P24 AG SERPL QL IA: NORMAL

## 2023-12-15 PROCEDURE — 3074F PR MOST RECENT SYSTOLIC BLOOD PRESSURE < 130 MM HG: ICD-10-PCS | Mod: CPTII,S$GLB,,

## 2023-12-15 PROCEDURE — 80074 ACUTE HEPATITIS PANEL: CPT

## 2023-12-15 PROCEDURE — 1160F PR REVIEW ALL MEDS BY PRESCRIBER/CLIN PHARMACIST DOCUMENTED: ICD-10-PCS | Mod: CPTII,S$GLB,,

## 2023-12-15 PROCEDURE — 1159F MED LIST DOCD IN RCRD: CPT | Mod: CPTII,S$GLB,,

## 2023-12-15 PROCEDURE — 3078F PR MOST RECENT DIASTOLIC BLOOD PRESSURE < 80 MM HG: ICD-10-PCS | Mod: CPTII,S$GLB,,

## 2023-12-15 PROCEDURE — 99213 OFFICE O/P EST LOW 20 MIN: CPT | Mod: S$GLB,,,

## 2023-12-15 PROCEDURE — 87491 CHLMYD TRACH DNA AMP PROBE: CPT

## 2023-12-15 PROCEDURE — 99213 PR OFFICE/OUTPT VISIT, EST, LEVL III, 20-29 MIN: ICD-10-PCS | Mod: S$GLB,,,

## 2023-12-15 PROCEDURE — 36415 COLL VENOUS BLD VENIPUNCTURE: CPT

## 2023-12-15 PROCEDURE — 3074F SYST BP LT 130 MM HG: CPT | Mod: CPTII,S$GLB,,

## 2023-12-15 PROCEDURE — 87591 N.GONORRHOEAE DNA AMP PROB: CPT

## 2023-12-15 PROCEDURE — 3008F BODY MASS INDEX DOCD: CPT | Mod: CPTII,S$GLB,,

## 2023-12-15 PROCEDURE — 99999 PR PBB SHADOW E&M-EST. PATIENT-LVL III: CPT | Mod: PBBFAC,,,

## 2023-12-15 PROCEDURE — 1160F RVW MEDS BY RX/DR IN RCRD: CPT | Mod: CPTII,S$GLB,,

## 2023-12-15 PROCEDURE — 3008F PR BODY MASS INDEX (BMI) DOCUMENTED: ICD-10-PCS | Mod: CPTII,S$GLB,,

## 2023-12-15 PROCEDURE — 99999 PR PBB SHADOW E&M-EST. PATIENT-LVL III: ICD-10-PCS | Mod: PBBFAC,,,

## 2023-12-15 PROCEDURE — 86592 SYPHILIS TEST NON-TREP QUAL: CPT

## 2023-12-15 PROCEDURE — 81514 NFCT DS BV&VAGINITIS DNA ALG: CPT

## 2023-12-15 PROCEDURE — 87389 HIV-1 AG W/HIV-1&-2 AB AG IA: CPT

## 2023-12-15 PROCEDURE — 1159F PR MEDICATION LIST DOCUMENTED IN MEDICAL RECORD: ICD-10-PCS | Mod: CPTII,S$GLB,,

## 2023-12-15 PROCEDURE — 3078F DIAST BP <80 MM HG: CPT | Mod: CPTII,S$GLB,,

## 2023-12-15 NOTE — PROGRESS NOTES
Aubrie Burks is a 23 y.o. female  presents with complaint of chunky vaginal discharge and vulvar irritation she noticed about 1 week ago. She states her symptoms have now resolved after her cycle ended.     She is sexually active. Denies STI exposure but would like STI screening. Would like vaginal and serology testing      Past Medical History:   Diagnosis Date    Generalized anxiety disorder      History reviewed. No pertinent surgical history.  Social History     Tobacco Use    Smoking status: Never    Smokeless tobacco: Never   Substance Use Topics    Alcohol use: Yes     Comment: socially    Drug use: Never     OB History    Para Term  AB Living   0 0 0 0 0 0   SAB IAB Ectopic Multiple Live Births   0 0 0 0 0       /78   Wt 60.7 kg (133 lb 13.1 oz)   LMP 2023 (Exact Date)   BMI 22.62 kg/m²     ROS:  GENERAL: No fever, chills, fatigability or weight loss.  ABDOMEN: No abdominal pain. Denies nausea or vomiting. No diarrhea or constipation  URINARY: No frequency, dysuria, hematuria.  VULVOVAGINAL: See HPI.  NEUROLOGICAL: No headaches. No vision changes.    PHYSICAL EXAM:  APPEARANCE: Well nourished, well developed, in no acute distress.  AFFECT: WNL, alert and oriented x 3  SKIN: No acne or hirsutism  CHEST: Good respiratory effect  ABDOMEN: Soft.  No tenderness or masses  PELVIC: Normal external genitalia without lesions.  Normal hair distribution.  Adequate perineal body, normal urethral meatus.  Vagina moist and well rugated without lesions or discharge.  Cervix pink, without lesions, discharge or tenderness.  No significant cystocele or rectocele.    EXTREMITIES: No edema.    ASSESSMENT and PLAN:  1. Vaginal discharge  Vaginosis Screen by DNA Probe    C. trachomatis/N. gonorrhoeae by AMP DNA      2. Routine screening for STI (sexually transmitted infection)  HIV 1/2 Ag/Ab (4th Gen)    RPR    Hepatitis Panel, Acute          Patient was counseled today on vaginitis  prevention including :  a. avoiding feminine products such as deoderant soaps, body wash, bubble bath, douches, deoderant tampons or pads, feminine wipes, chronic pad use, etc.  b. avoiding other vulvovaginal irritants such as long hot baths, humidity, tight, synthetic clothing, chlorine and sitting around in wet bathing suits  c. wearing cotton underwear and no underwear to bed  d. taking showers instead of baths and use a hair dryer on cool setting afterwards to dry  e. shower immediately after exercise and change clothes    Will follow results and update treatment as needed.    Patient confirms understanding of encounter and all medical questions answered.

## 2023-12-16 LAB
HAV IGM SERPL QL IA: NORMAL
HBV CORE IGM SERPL QL IA: NORMAL
HBV SURFACE AG SERPL QL IA: NORMAL
HCV AB SERPL QL IA: NORMAL
RPR SER QL: NORMAL

## 2023-12-18 ENCOUNTER — PATIENT MESSAGE (OUTPATIENT)
Dept: OBSTETRICS AND GYNECOLOGY | Facility: CLINIC | Age: 23
End: 2023-12-18
Payer: COMMERCIAL

## 2023-12-18 LAB
BACTERIAL VAGINOSIS DNA: POSITIVE
C TRACH DNA SPEC QL NAA+PROBE: NOT DETECTED
CANDIDA GLABRATA DNA: NEGATIVE
CANDIDA KRUSEI DNA: NEGATIVE
CANDIDA RRNA VAG QL PROBE: POSITIVE
N GONORRHOEA DNA SPEC QL NAA+PROBE: NOT DETECTED
T VAGINALIS RRNA GENITAL QL PROBE: NEGATIVE

## 2023-12-18 RX ORDER — FLUCONAZOLE 150 MG/1
150 TABLET ORAL DAILY
Qty: 2 TABLET | Refills: 1 | Status: SHIPPED | OUTPATIENT
Start: 2023-12-18

## 2023-12-18 RX ORDER — METRONIDAZOLE 500 MG/1
500 TABLET ORAL 2 TIMES DAILY
Qty: 14 TABLET | Refills: 0 | Status: SHIPPED | OUTPATIENT
Start: 2023-12-18 | End: 2023-12-25

## 2024-01-04 ENCOUNTER — OFFICE VISIT (OUTPATIENT)
Dept: OBSTETRICS AND GYNECOLOGY | Facility: CLINIC | Age: 24
End: 2024-01-04
Payer: COMMERCIAL

## 2024-01-04 VITALS
WEIGHT: 133.38 LBS | DIASTOLIC BLOOD PRESSURE: 87 MMHG | SYSTOLIC BLOOD PRESSURE: 133 MMHG | BODY MASS INDEX: 22.54 KG/M2

## 2024-01-04 DIAGNOSIS — N89.8 VAGINAL IRRITATION: Primary | ICD-10-CM

## 2024-01-04 PROCEDURE — 99999 PR PBB SHADOW E&M-EST. PATIENT-LVL III: CPT | Mod: PBBFAC,,, | Performed by: OBSTETRICS & GYNECOLOGY

## 2024-01-04 PROCEDURE — 3075F SYST BP GE 130 - 139MM HG: CPT | Mod: CPTII,S$GLB,, | Performed by: OBSTETRICS & GYNECOLOGY

## 2024-01-04 PROCEDURE — 81514 NFCT DS BV&VAGINITIS DNA ALG: CPT | Performed by: OBSTETRICS & GYNECOLOGY

## 2024-01-04 PROCEDURE — 3008F BODY MASS INDEX DOCD: CPT | Mod: CPTII,S$GLB,, | Performed by: OBSTETRICS & GYNECOLOGY

## 2024-01-04 PROCEDURE — 3079F DIAST BP 80-89 MM HG: CPT | Mod: CPTII,S$GLB,, | Performed by: OBSTETRICS & GYNECOLOGY

## 2024-01-04 PROCEDURE — 1159F MED LIST DOCD IN RCRD: CPT | Mod: CPTII,S$GLB,, | Performed by: OBSTETRICS & GYNECOLOGY

## 2024-01-04 PROCEDURE — 99212 OFFICE O/P EST SF 10 MIN: CPT | Mod: S$GLB,,, | Performed by: OBSTETRICS & GYNECOLOGY

## 2024-01-04 NOTE — PROGRESS NOTES
Chief Complaint   Patient presents with    Follow-up       HISTORY OF PRESENT ILLNESS:   Aubrie Burks is a 23 y.o. female  g0 who presents for f/u for yeast infection. Took last dose of diflucan today and finished medications for BV as well. Still feels irritation in on area but over all much improved.      Past Medical History:   Diagnosis Date    Generalized anxiety disorder         History reviewed. No pertinent surgical history.        Social History     Socioeconomic History    Marital status: Single   Tobacco Use    Smoking status: Never    Smokeless tobacco: Never   Substance and Sexual Activity    Alcohol use: Yes     Comment: socially    Drug use: Never    Sexual activity: Not Currently       Family History   Problem Relation Age of Onset    Diabetes Mother     Esophageal cancer Maternal Grandmother     Hypertension Maternal Grandfather     Multiple myeloma Maternal Grandfather            OB History    Para Term  AB Living   0 0 0 0 0 0   SAB IAB Ectopic Multiple Live Births   0 0 0 0 0         COMPREHENSIVE GYN HISTORY:  PAP History: Denies abnormal Paps  Infection History: Denies STDs. Denies PID.  Benign History:Denies uterine fibroids. Denies ovarian cysts. Denies endometriosis Denies other conditions.  Cancer History: Denies cervical cancer. Denies uterine cancer or hyperplasia. Denies ovarian cancer. Denies vulvar cancer or pre-cancer. Denies vaginal cancer or pre-cancer. Denies breast cancer. Denies colon cancer.  Cycle: /5-6 days   Unsure about HPV vaccine    ROS:  Negative       /87   Wt 60.5 kg (133 lb 6.1 oz)   LMP 2023 (Exact Date)   BMI 22.54 kg/m²     APPEARANCE: Well nourished, well developed, in no acute distress.    PELVIC:   VULVA: No lesions. Normal female genitalia. No erythema or rashes.   Normal appearing vaginal mucosa, normal cervix   Normal sized uterus and adnexa with no masses appreciated        1. Vaginal irritation          Plan:  Most likely  still healing since just finished medications. Swab repeated, will do another dose of diflucan if still positive.       F/u in 1 yr or PRN

## 2024-01-09 ENCOUNTER — PATIENT MESSAGE (OUTPATIENT)
Dept: OBSTETRICS AND GYNECOLOGY | Facility: CLINIC | Age: 24
End: 2024-01-09
Payer: COMMERCIAL

## 2024-01-25 ENCOUNTER — TELEPHONE (OUTPATIENT)
Dept: OBSTETRICS AND GYNECOLOGY | Facility: CLINIC | Age: 24
End: 2024-01-25
Payer: COMMERCIAL

## 2024-01-25 NOTE — TELEPHONE ENCOUNTER
Called pt and left a message regarding symptoms told her over the counter medication she can take if she thinks she may have a yeast infection. Waiting on return phone call to confirm.

## 2024-01-25 NOTE — TELEPHONE ENCOUNTER
----- Message from Dayo Santos sent at 1/24/2024  8:04 AM CST -----  Contact: Pt  .Type:  Needs Medical Advice    Who Called: pt  Symptoms (please be specific):  itching and burning  How long has patient had these symptoms:  4 day  Pharmacy name and phone #:  [facility] #[id number] - [place], [place] - [address] [facility]   Phone: [phone]  Fax: [phone]  Would the patient rather a call back or a response via [facility]?  Call back  Best Call Back Number: [phone]  Additional Information: Pt. Does not have an appt. Until  01/29/2024 and she is requesting some type of relief from her symptoms.

## 2024-01-25 NOTE — TELEPHONE ENCOUNTER
Pt is still having symptoms. Some burning, small bumps and swollen. Wants to get tests done and is requesting for a sooner appt.   She has an appt on Monday.

## 2024-01-26 ENCOUNTER — TELEPHONE (OUTPATIENT)
Dept: OBSTETRICS AND GYNECOLOGY | Facility: CLINIC | Age: 24
End: 2024-01-26
Payer: COMMERCIAL

## 2024-01-26 NOTE — TELEPHONE ENCOUNTER
Called pt and left a message due to her not answering the phone waiting on return call to discuss symptoms.

## 2024-01-26 NOTE — TELEPHONE ENCOUNTER
----- Message from Karen Canada sent at 1/25/2024  5:25 PM CST -----  Type:  Patient Returning Call    Who Called:Pt   Who Left Message for Patient: Office   Does the patient know what this is regarding?: N/A   Would the patient rather a call back or a response via [facility]?  Call back   Best Call Back Number:[phone]  Additional Information: Pt is returning a call to this provider office

## 2024-01-29 ENCOUNTER — OFFICE VISIT (OUTPATIENT)
Dept: OBSTETRICS AND GYNECOLOGY | Facility: CLINIC | Age: 24
End: 2024-01-29
Payer: COMMERCIAL

## 2024-01-29 ENCOUNTER — LAB VISIT (OUTPATIENT)
Dept: LAB | Facility: HOSPITAL | Age: 24
End: 2024-01-29
Attending: OBSTETRICS & GYNECOLOGY
Payer: COMMERCIAL

## 2024-01-29 VITALS — BODY MASS INDEX: 22.56 KG/M2 | DIASTOLIC BLOOD PRESSURE: 83 MMHG | WEIGHT: 133.5 LBS | SYSTOLIC BLOOD PRESSURE: 125 MMHG

## 2024-01-29 DIAGNOSIS — Z11.3 SCREENING EXAMINATION FOR STD (SEXUALLY TRANSMITTED DISEASE): ICD-10-CM

## 2024-01-29 DIAGNOSIS — Z72.89 OTHER PROBLEMS RELATED TO LIFESTYLE: ICD-10-CM

## 2024-01-29 DIAGNOSIS — N89.8 VAGINAL LESION: Primary | ICD-10-CM

## 2024-01-29 LAB
HAV IGM SERPL QL IA: NORMAL
HBV CORE IGM SERPL QL IA: NORMAL
HBV SURFACE AG SERPL QL IA: NORMAL
HCV AB SERPL QL IA: NORMAL
HIV 1+2 AB+HIV1 P24 AG SERPL QL IA: NORMAL

## 2024-01-29 PROCEDURE — 99213 OFFICE O/P EST LOW 20 MIN: CPT | Mod: S$GLB,,, | Performed by: OBSTETRICS & GYNECOLOGY

## 2024-01-29 PROCEDURE — 87491 CHLMYD TRACH DNA AMP PROBE: CPT | Performed by: OBSTETRICS & GYNECOLOGY

## 2024-01-29 PROCEDURE — 87389 HIV-1 AG W/HIV-1&-2 AB AG IA: CPT | Performed by: OBSTETRICS & GYNECOLOGY

## 2024-01-29 PROCEDURE — 99999 PR PBB SHADOW E&M-EST. PATIENT-LVL III: CPT | Mod: PBBFAC,,, | Performed by: OBSTETRICS & GYNECOLOGY

## 2024-01-29 PROCEDURE — 3074F SYST BP LT 130 MM HG: CPT | Mod: CPTII,S$GLB,, | Performed by: OBSTETRICS & GYNECOLOGY

## 2024-01-29 PROCEDURE — 80074 ACUTE HEPATITIS PANEL: CPT | Performed by: OBSTETRICS & GYNECOLOGY

## 2024-01-29 PROCEDURE — 86696 HERPES SIMPLEX TYPE 2 TEST: CPT | Performed by: OBSTETRICS & GYNECOLOGY

## 2024-01-29 PROCEDURE — 36415 COLL VENOUS BLD VENIPUNCTURE: CPT | Performed by: OBSTETRICS & GYNECOLOGY

## 2024-01-29 PROCEDURE — 3008F BODY MASS INDEX DOCD: CPT | Mod: CPTII,S$GLB,, | Performed by: OBSTETRICS & GYNECOLOGY

## 2024-01-29 PROCEDURE — 3079F DIAST BP 80-89 MM HG: CPT | Mod: CPTII,S$GLB,, | Performed by: OBSTETRICS & GYNECOLOGY

## 2024-01-29 PROCEDURE — 81514 NFCT DS BV&VAGINITIS DNA ALG: CPT | Performed by: OBSTETRICS & GYNECOLOGY

## 2024-01-29 PROCEDURE — 86694 HERPES SIMPLEX NES ANTBDY: CPT | Performed by: OBSTETRICS & GYNECOLOGY

## 2024-01-29 PROCEDURE — 1159F MED LIST DOCD IN RCRD: CPT | Mod: CPTII,S$GLB,, | Performed by: OBSTETRICS & GYNECOLOGY

## 2024-01-29 PROCEDURE — 86592 SYPHILIS TEST NON-TREP QUAL: CPT | Performed by: OBSTETRICS & GYNECOLOGY

## 2024-01-29 NOTE — PROGRESS NOTES
Chief Complaint   Patient presents with    Vaginal Itching       HISTORY OF PRESENT ILLNESS:   Aubrie Burks is a 23 y.o. female  g0 who presents for f/u for bumps that appeared a few days after intercourse and few days before her period on the left side of the labia that were itching. They have since resolved and were not there today.      Past Medical History:   Diagnosis Date    Generalized anxiety disorder         History reviewed. No pertinent surgical history.        Social History     Socioeconomic History    Marital status: Single   Tobacco Use    Smoking status: Never    Smokeless tobacco: Never   Substance and Sexual Activity    Alcohol use: Yes     Comment: socially    Drug use: Never    Sexual activity: Yes     Partners: Male       Family History   Problem Relation Age of Onset    Diabetes Mother     Esophageal cancer Maternal Grandmother     Hypertension Maternal Grandfather     Multiple myeloma Maternal Grandfather            OB History    Para Term  AB Living   0 0 0 0 0 0   SAB IAB Ectopic Multiple Live Births   0 0 0 0 0         COMPREHENSIVE GYN HISTORY:  PAP History: Denies abnormal Paps  Infection History: Denies STDs. Denies PID.  Benign History:Denies uterine fibroids. Denies ovarian cysts. Denies endometriosis Denies other conditions.  Cancer History: Denies cervical cancer. Denies uterine cancer or hyperplasia. Denies ovarian cancer. Denies vulvar cancer or pre-cancer. Denies vaginal cancer or pre-cancer. Denies breast cancer. Denies colon cancer.  Cycle: /5-6 days   Unsure about HPV vaccine    ROS:  Negative       /83   Wt 60.6 kg (133 lb 7.8 oz)   LMP 2024 (Exact Date)   BMI 22.56 kg/m²     APPEARANCE: Well nourished, well developed, in no acute distress.    PELVIC:   VULVA: No lesions. Normal female genitalia. No erythema or rashes.   Normal appearing vaginal mucosa, normal cervix   Normal sized uterus and adnexa with no masses appreciated        1.  Vaginal lesion    2. Screening examination for STD (sexually transmitted disease)    3. Other problems related to lifestyle          Plan:  Affrim and gc/ct done. Discussed a lot of possibilities of what it could have been including HSV. Discussed best way to dx that is to see it in person and swab it. If happens again when let us know. She wants to do herpes blood work testing. Discussed screening for that and how not specific to genital lesions. She would like to do it anyway.       Face to Face time with patient: 25 minutes of total time spent on the encounter, which includes face to face time and non-face to face time preparing to see the patient (eg, review of tests), Obtaining and/or reviewing separately obtained history, Documenting clinical information in the electronic or other health record, Independently interpreting results (not separately reported) and communicating results to the patient/family/caregiver, or Care coordination (not separately reported).

## 2024-01-30 ENCOUNTER — PATIENT MESSAGE (OUTPATIENT)
Dept: OBSTETRICS AND GYNECOLOGY | Facility: CLINIC | Age: 24
End: 2024-01-30
Payer: COMMERCIAL

## 2024-01-30 LAB
BACTERIAL VAGINOSIS DNA: POSITIVE
CANDIDA GLABRATA DNA: NEGATIVE
CANDIDA KRUSEI DNA: NEGATIVE
CANDIDA RRNA VAG QL PROBE: POSITIVE
HSV1 IGG SERPL QL IA: POSITIVE
HSV2 IGG SERPL QL IA: NEGATIVE
RPR SER QL: NORMAL
T VAGINALIS RRNA GENITAL QL PROBE: NEGATIVE

## 2024-01-30 RX ORDER — METRONIDAZOLE 500 MG/1
500 TABLET ORAL EVERY 12 HOURS
Qty: 14 TABLET | Refills: 0 | Status: SHIPPED | OUTPATIENT
Start: 2024-01-30 | End: 2024-02-06

## 2024-01-30 RX ORDER — FLUCONAZOLE 150 MG/1
150 TABLET ORAL
Qty: 2 TABLET | Refills: 0 | Status: SHIPPED | OUTPATIENT
Start: 2024-01-30 | End: 2024-02-03

## 2024-02-01 ENCOUNTER — PATIENT MESSAGE (OUTPATIENT)
Dept: OBSTETRICS AND GYNECOLOGY | Facility: CLINIC | Age: 24
End: 2024-02-01
Payer: COMMERCIAL

## 2024-02-01 LAB
C TRACH DNA SPEC QL NAA+PROBE: NOT DETECTED
HSV AB, IGM BY EIA: 1.03 INDEX
N GONORRHOEA DNA SPEC QL NAA+PROBE: NOT DETECTED

## 2024-02-05 ENCOUNTER — PATIENT MESSAGE (OUTPATIENT)
Dept: OBSTETRICS AND GYNECOLOGY | Facility: CLINIC | Age: 24
End: 2024-02-05
Payer: COMMERCIAL

## 2024-02-05 ENCOUNTER — TELEPHONE (OUTPATIENT)
Dept: OBSTETRICS AND GYNECOLOGY | Facility: HOSPITAL | Age: 24
End: 2024-02-05
Payer: COMMERCIAL

## 2024-02-15 ENCOUNTER — TELEPHONE (OUTPATIENT)
Dept: OBSTETRICS AND GYNECOLOGY | Facility: CLINIC | Age: 24
End: 2024-02-15
Payer: COMMERCIAL

## 2024-02-15 NOTE — TELEPHONE ENCOUNTER
----- Message from Gloria Estes MD sent at 2/15/2024  4:37 AM CST -----  Regarding: Appt  Pls call her and see if her issue is still going on?    If she see if either Hoda or the on call doctor can see her to look and her vulva to tell her if this is herpes.    Thanks,  Gloria

## 2024-02-16 ENCOUNTER — TELEPHONE (OUTPATIENT)
Dept: OBSTETRICS AND GYNECOLOGY | Facility: CLINIC | Age: 24
End: 2024-02-16
Payer: COMMERCIAL

## 2024-02-16 NOTE — TELEPHONE ENCOUNTER
Called pt and lvm for her to call me back regarding scheduling appointment.          ----- Message from Merlene Armijo sent at 2/15/2024  1:19 PM CST -----  Type:  Patient Returning Call    Who Called:pt  Who Left Message for Patient:office  Does the patient know what this is regarding?:same day appointment   Would the patient rather a call back or a response via [facility]?  Call back   Best Call Back Number:[phone]  Additional Information:   Attn nurse harriet

## 2024-04-11 ENCOUNTER — PATIENT MESSAGE (OUTPATIENT)
Dept: OBSTETRICS AND GYNECOLOGY | Facility: CLINIC | Age: 24
End: 2024-04-11
Payer: COMMERCIAL

## 2024-07-17 ENCOUNTER — TELEPHONE (OUTPATIENT)
Dept: OBSTETRICS AND GYNECOLOGY | Facility: CLINIC | Age: 24
End: 2024-07-17
Payer: COMMERCIAL

## 2024-07-17 NOTE — TELEPHONE ENCOUNTER
----- Message from Dayo Santos sent at 7/17/2024 12:56 PM CDT -----  Contact: Pt  .Type:  Needs Medical Advice    Who Called: pt    Would the patient rather a call back or a response via [facility]?  Call back  Best Call Back Number: [phone]   Additional Information:  Pt. Is returning a call back to the [facility]

## 2024-07-17 NOTE — TELEPHONE ENCOUNTER
See mychart message. Urine tests are good. I would recommend repeat in a week if period still hasn't come that ways she can avoid the cost of the blood one and the stick!

## 2024-07-17 NOTE — TELEPHONE ENCOUNTER
Pt cycle is 9 days late. She has taken a few pregnancy tests that came back negative.     Pt is not currently on birth control and always has regular cycles.    No recent gisella loss/gain, changes to diet, or new medications.    Patient would like to complete [facility] blood work.

## 2024-07-17 NOTE — TELEPHONE ENCOUNTER
----- Message from Merlene Armijo sent at 7/17/2024 11:08 AM CDT -----  Type:  Needs Medical Advice    Who Called:  pt   Symptoms (please be specific):  confirm pregnancy    How long has patient had these symptoms:   n/a      Would the patient rather a call back or a response via [facility]?  Call back   Best Call Back Number:  [phone]  Additional Information:  pt last cycle was on 06/16/24

## 2024-08-03 ENCOUNTER — PATIENT MESSAGE (OUTPATIENT)
Dept: OBSTETRICS AND GYNECOLOGY | Facility: CLINIC | Age: 24
End: 2024-08-03
Payer: COMMERCIAL

## 2024-08-05 ENCOUNTER — TELEPHONE (OUTPATIENT)
Dept: OBSTETRICS AND GYNECOLOGY | Facility: CLINIC | Age: 24
End: 2024-08-05
Payer: COMMERCIAL

## 2024-08-09 ENCOUNTER — LAB VISIT (OUTPATIENT)
Dept: LAB | Facility: HOSPITAL | Age: 24
End: 2024-08-09
Attending: OBSTETRICS & GYNECOLOGY
Payer: COMMERCIAL

## 2024-08-09 ENCOUNTER — OFFICE VISIT (OUTPATIENT)
Dept: OBSTETRICS AND GYNECOLOGY | Facility: CLINIC | Age: 24
End: 2024-08-09
Payer: COMMERCIAL

## 2024-08-09 VITALS
WEIGHT: 146.38 LBS | BODY MASS INDEX: 24.74 KG/M2 | DIASTOLIC BLOOD PRESSURE: 81 MMHG | SYSTOLIC BLOOD PRESSURE: 128 MMHG

## 2024-08-09 DIAGNOSIS — Z12.4 PAP SMEAR FOR CERVICAL CANCER SCREENING: ICD-10-CM

## 2024-08-09 DIAGNOSIS — N91.5 OLIGOMENORRHEA, UNSPECIFIED TYPE: ICD-10-CM

## 2024-08-09 DIAGNOSIS — Z72.89 OTHER PROBLEMS RELATED TO LIFESTYLE: ICD-10-CM

## 2024-08-09 DIAGNOSIS — N89.8 VAGINAL ITCHING: ICD-10-CM

## 2024-08-09 DIAGNOSIS — Z01.419 ENCOUNTER FOR ANNUAL ROUTINE GYNECOLOGICAL EXAMINATION: Primary | ICD-10-CM

## 2024-08-09 DIAGNOSIS — Z11.3 SCREENING EXAMINATION FOR STD (SEXUALLY TRANSMITTED DISEASE): ICD-10-CM

## 2024-08-09 LAB
ESTRADIOL SERPL-MCNC: 71 PG/ML
FSH SERPL-ACNC: 7.01 MIU/ML
HAV IGM SERPL QL IA: NORMAL
HBV CORE IGM SERPL QL IA: NORMAL
HBV SURFACE AG SERPL QL IA: NORMAL
HCV AB SERPL QL IA: NORMAL
HIV 1+2 AB+HIV1 P24 AG SERPL QL IA: NORMAL
PROLACTIN SERPL IA-MCNC: 21.2 NG/ML (ref 5.2–26.5)
TREPONEMA PALLIDUM IGG+IGM AB [PRESENCE] IN SERUM OR PLASMA BY IMMUNOASSAY: NONREACTIVE
TSH SERPL DL<=0.005 MIU/L-ACNC: 0.64 UIU/ML (ref 0.4–4)

## 2024-08-09 PROCEDURE — 99999 PR PBB SHADOW E&M-EST. PATIENT-LVL III: CPT | Mod: PBBFAC,,, | Performed by: OBSTETRICS & GYNECOLOGY

## 2024-08-09 PROCEDURE — 87389 HIV-1 AG W/HIV-1&-2 AB AG IA: CPT | Performed by: OBSTETRICS & GYNECOLOGY

## 2024-08-09 PROCEDURE — 82670 ASSAY OF TOTAL ESTRADIOL: CPT | Performed by: OBSTETRICS & GYNECOLOGY

## 2024-08-09 PROCEDURE — 87491 CHLMYD TRACH DNA AMP PROBE: CPT | Performed by: OBSTETRICS & GYNECOLOGY

## 2024-08-09 PROCEDURE — 84443 ASSAY THYROID STIM HORMONE: CPT | Performed by: OBSTETRICS & GYNECOLOGY

## 2024-08-09 PROCEDURE — 83001 ASSAY OF GONADOTROPIN (FSH): CPT | Performed by: OBSTETRICS & GYNECOLOGY

## 2024-08-09 PROCEDURE — 86593 SYPHILIS TEST NON-TREP QUANT: CPT | Performed by: OBSTETRICS & GYNECOLOGY

## 2024-08-09 PROCEDURE — 80074 ACUTE HEPATITIS PANEL: CPT | Performed by: OBSTETRICS & GYNECOLOGY

## 2024-08-09 PROCEDURE — 84146 ASSAY OF PROLACTIN: CPT | Performed by: OBSTETRICS & GYNECOLOGY

## 2024-08-09 PROCEDURE — 87591 N.GONORRHOEAE DNA AMP PROB: CPT | Performed by: OBSTETRICS & GYNECOLOGY

## 2024-08-09 PROCEDURE — 36415 COLL VENOUS BLD VENIPUNCTURE: CPT | Performed by: OBSTETRICS & GYNECOLOGY

## 2024-08-10 LAB
C TRACH DNA SPEC QL NAA+PROBE: NOT DETECTED
N GONORRHOEA DNA SPEC QL NAA+PROBE: NOT DETECTED

## 2024-08-14 ENCOUNTER — PATIENT MESSAGE (OUTPATIENT)
Dept: OBSTETRICS AND GYNECOLOGY | Facility: CLINIC | Age: 24
End: 2024-08-14
Payer: COMMERCIAL

## 2024-08-16 ENCOUNTER — HOSPITAL ENCOUNTER (OUTPATIENT)
Dept: RADIOLOGY | Facility: HOSPITAL | Age: 24
Discharge: HOME | End: 2024-08-16
Attending: OBSTETRICS & GYNECOLOGY
Payer: COMMERCIAL

## 2024-08-16 ENCOUNTER — TELEPHONE (OUTPATIENT)
Dept: OBSTETRICS AND GYNECOLOGY | Facility: CLINIC | Age: 24
End: 2024-08-16
Payer: COMMERCIAL

## 2024-08-16 DIAGNOSIS — N91.5 OLIGOMENORRHEA, UNSPECIFIED TYPE: ICD-10-CM

## 2024-08-16 DIAGNOSIS — N83.209 CYST OF OVARY, UNSPECIFIED LATERALITY: Primary | ICD-10-CM

## 2024-08-16 PROCEDURE — 76830 TRANSVAGINAL US NON-OB: CPT | Mod: TC

## 2024-08-16 PROCEDURE — 76856 US EXAM PELVIC COMPLETE: CPT | Mod: 26,,, | Performed by: STUDENT IN AN ORGANIZED HEALTH CARE EDUCATION/TRAINING PROGRAM

## 2024-08-16 PROCEDURE — 76830 TRANSVAGINAL US NON-OB: CPT | Mod: 26,,, | Performed by: STUDENT IN AN ORGANIZED HEALTH CARE EDUCATION/TRAINING PROGRAM

## 2024-08-16 PROCEDURE — 76856 US EXAM PELVIC COMPLETE: CPT | Mod: TC

## 2024-08-16 NOTE — TELEPHONE ENCOUNTER
Pls set up repeat US in 6-8 weeks and virtual visit to review all of this if she has more questions

## 2024-08-23 ENCOUNTER — PATIENT MESSAGE (OUTPATIENT)
Dept: ADMINISTRATIVE | Facility: HOSPITAL | Age: 24
End: 2024-08-23
Payer: COMMERCIAL

## 2024-10-07 ENCOUNTER — HOSPITAL ENCOUNTER (OUTPATIENT)
Dept: RADIOLOGY | Facility: HOSPITAL | Age: 24
Discharge: HOME | End: 2024-10-07
Attending: OBSTETRICS & GYNECOLOGY
Payer: COMMERCIAL

## 2024-10-07 ENCOUNTER — PATIENT MESSAGE (OUTPATIENT)
Dept: OBSTETRICS AND GYNECOLOGY | Facility: CLINIC | Age: 24
End: 2024-10-07
Payer: COMMERCIAL

## 2024-10-07 DIAGNOSIS — N83.209 CYST OF OVARY, UNSPECIFIED LATERALITY: ICD-10-CM

## 2024-10-07 PROCEDURE — 76856 US EXAM PELVIC COMPLETE: CPT | Mod: TC

## 2024-10-07 PROCEDURE — 76830 TRANSVAGINAL US NON-OB: CPT | Mod: TC

## 2024-10-07 PROCEDURE — 76856 US EXAM PELVIC COMPLETE: CPT | Mod: 26,,, | Performed by: STUDENT IN AN ORGANIZED HEALTH CARE EDUCATION/TRAINING PROGRAM

## 2024-10-07 PROCEDURE — 76830 TRANSVAGINAL US NON-OB: CPT | Mod: 26,,, | Performed by: STUDENT IN AN ORGANIZED HEALTH CARE EDUCATION/TRAINING PROGRAM

## 2024-10-09 ENCOUNTER — OFFICE VISIT (OUTPATIENT)
Dept: FAMILY MEDICINE | Facility: CLINIC | Age: 24
End: 2024-10-09
Payer: COMMERCIAL

## 2024-10-09 VITALS
HEART RATE: 83 BPM | SYSTOLIC BLOOD PRESSURE: 118 MMHG | DIASTOLIC BLOOD PRESSURE: 60 MMHG | TEMPERATURE: 99 F | WEIGHT: 149.06 LBS | HEIGHT: 65 IN | OXYGEN SATURATION: 99 % | BODY MASS INDEX: 24.83 KG/M2

## 2024-10-09 DIAGNOSIS — J32.9 BACTERIAL SINUSITIS: Primary | ICD-10-CM

## 2024-10-09 DIAGNOSIS — B96.89 BACTERIAL SINUSITIS: Primary | ICD-10-CM

## 2024-10-09 PROCEDURE — 99214 OFFICE O/P EST MOD 30 MIN: CPT | Mod: S$GLB,,,

## 2024-10-09 PROCEDURE — 1160F RVW MEDS BY RX/DR IN RCRD: CPT | Mod: CPTII,S$GLB,,

## 2024-10-09 PROCEDURE — 99999 PR PBB SHADOW E&M-EST. PATIENT-LVL IV: CPT | Mod: PBBFAC,,,

## 2024-10-09 PROCEDURE — 1159F MED LIST DOCD IN RCRD: CPT | Mod: CPTII,S$GLB,,

## 2024-10-09 PROCEDURE — 3078F DIAST BP <80 MM HG: CPT | Mod: CPTII,S$GLB,,

## 2024-10-09 PROCEDURE — 3008F BODY MASS INDEX DOCD: CPT | Mod: CPTII,S$GLB,,

## 2024-10-09 PROCEDURE — 3074F SYST BP LT 130 MM HG: CPT | Mod: CPTII,S$GLB,,

## 2024-10-09 RX ORDER — AZELASTINE 1 MG/ML
1 SPRAY, METERED NASAL 2 TIMES DAILY
Qty: 30 ML | Refills: 1 | Status: SHIPPED | OUTPATIENT
Start: 2024-10-09 | End: 2025-10-09

## 2024-10-09 RX ORDER — AMOXICILLIN AND CLAVULANATE POTASSIUM 875; 125 MG/1; MG/1
1 TABLET, FILM COATED ORAL EVERY 12 HOURS
Qty: 14 TABLET | Refills: 0 | Status: SHIPPED | OUTPATIENT
Start: 2024-10-09 | End: 2024-10-16

## 2024-10-09 RX ORDER — FLUTICASONE PROPIONATE 50 MCG
1 SPRAY, SUSPENSION (ML) NASAL DAILY
Qty: 11.1 ML | Refills: 3 | Status: SHIPPED | OUTPATIENT
Start: 2024-10-09

## 2024-10-09 NOTE — PROGRESS NOTES
"  Subjective     Patient ID: Aubrie Bruks is a 24 y.o. female.    Chief Complaint: Cough, Nasal Congestion, and Headache  23 y/o female presents to clinic for 1.5 week history of fever, sinus congestion, post nasal drip. She works at a  and states that all the kids have been sick. She has tried tylenol and mucinex. This helped a little . She states that she is starting to feel worse.     Dnies N/V/D      Cough  Associated symptoms include a fever, headaches, postnasal drip and rhinorrhea. Pertinent negatives include no chest pain, ear pain, rash, shortness of breath or wheezing.   Headache   Associated symptoms include coughing, a fever, rhinorrhea and sinus pressure. Pertinent negatives include no ear pain, nausea or vomiting.     Review of Systems   Constitutional:  Positive for fatigue and fever.   HENT:  Positive for postnasal drip, rhinorrhea and sinus pressure/congestion. Negative for ear pain.    Respiratory:  Positive for cough. Negative for shortness of breath and wheezing.    Cardiovascular:  Negative for chest pain and palpitations.   Gastrointestinal:  Negative for diarrhea, nausea and vomiting.   Genitourinary:  Negative for difficulty urinating, dysuria and hematuria.   Integumentary:  Negative for rash.   Neurological:  Positive for headaches.          Objective     Vitals:    10/09/24 1428   BP: 118/60   Pulse: 83   Temp: 98.6 °F (37 °C)   TempSrc: Oral   SpO2: 99%   Weight: 67.6 kg (149 lb 0.5 oz)   Height: 5' 4.5" (1.638 m)   PainSc:   4   PainLoc: Generalized      Physical Exam  Constitutional:       General: She is not in acute distress.  HENT:      Head: Normocephalic and atraumatic.      Right Ear: Tympanic membrane, ear canal and external ear normal.      Left Ear: Tympanic membrane, ear canal and external ear normal.      Nose: Congestion and rhinorrhea present.      Mouth/Throat:      Pharynx: Posterior oropharyngeal erythema present. No oropharyngeal exudate.   Eyes:      General:  "        Right eye: No discharge.         Left eye: No discharge.   Cardiovascular:      Rate and Rhythm: Normal rate and regular rhythm.      Heart sounds: No murmur heard.  Pulmonary:      Effort: Pulmonary effort is normal. No respiratory distress.      Breath sounds: Normal breath sounds. No wheezing.   Abdominal:      General: Bowel sounds are normal.      Tenderness: There is no abdominal tenderness. There is no guarding.   Musculoskeletal:      Cervical back: No rigidity.      Right lower leg: No edema.      Left lower leg: No edema.   Lymphadenopathy:      Cervical: No cervical adenopathy.   Neurological:      Mental Status: She is alert and oriented to person, place, and time.              Assessment and Plan     1. Bacterial sinusitis  -     amoxicillin-clavulanate 875-125mg (AUGMENTIN) 875-125 mg per tablet; Take 1 tablet by mouth every 12 (twelve) hours. for 7 days  Dispense: 14 tablet; Refill: 0  -     fluticasone propionate (FLONASE) 50 mcg/actuation nasal spray; 1 spray (50 mcg total) by Each Nostril route once daily.  Dispense: 11.1 mL; Refill: 3  -     azelastine (ASTELIN) 137 mcg (0.1 %) nasal spray; 1 spray (137 mcg total) by Nasal route 2 (two) times daily.  Dispense: 30 mL; Refill: 1        Excuse letter given     Antibiotics till complete  Astelin nasal spray  Flonase BID  Nasal saline spray for congestion.  Tylenol 500 mg TID or ibuprofen 600 mg with meals  Aggressive fluids  Buckwheat honey for possible cough  Follow up if symptoms aren't resolving.  Follow up with PCP as needed    No follow-ups on file.    30 minutes of total time spent on the encounter, which includes face to face time and non-face to face time preparing to see the patient (eg, review of tests), Obtaining and/or reviewing separately obtained history, Documenting clinical information in the electronic or other health record, Independently interpreting results (not separately reported) and communicating results to the  patient/family/caregiver, or Care coordination (not separately reported).      Chelsi Ruiz PA-C  Family Practice/Internal Medicine   Office Phone: [phone]

## 2024-10-09 NOTE — PATIENT INSTRUCTIONS
Excuse letter given     Antibiotics till complete  Astelin nasal spray  Flonase BID  Nasal saline spray for congestion.  Tylenol 500 mg TID or ibuprofen 600 mg with meals  Aggressive fluids  Buckwheat honey for possible cough  Follow up if symptoms aren't resolving.  Follow up with PCP as needed

## 2024-10-17 ENCOUNTER — HOSPITAL ENCOUNTER (OUTPATIENT)
Dept: RADIOLOGY | Facility: HOSPITAL | Age: 24
Discharge: HOME | End: 2024-10-17
Attending: STUDENT IN AN ORGANIZED HEALTH CARE EDUCATION/TRAINING PROGRAM
Payer: COMMERCIAL

## 2024-10-17 ENCOUNTER — TELEPHONE (OUTPATIENT)
Dept: INTERNAL MEDICINE | Facility: CLINIC | Age: 24
End: 2024-10-17
Payer: COMMERCIAL

## 2024-10-17 ENCOUNTER — E-VISIT (OUTPATIENT)
Dept: FAMILY MEDICINE | Facility: CLINIC | Age: 24
End: 2024-10-17
Payer: COMMERCIAL

## 2024-10-17 DIAGNOSIS — R05.2 SUBACUTE COUGH: Primary | ICD-10-CM

## 2024-10-17 DIAGNOSIS — R05.2 SUBACUTE COUGH: ICD-10-CM

## 2024-10-17 PROCEDURE — 71046 X-RAY EXAM CHEST 2 VIEWS: CPT | Mod: TC,FY

## 2024-10-17 PROCEDURE — 71046 X-RAY EXAM CHEST 2 VIEWS: CPT | Mod: 26,,, | Performed by: STUDENT IN AN ORGANIZED HEALTH CARE EDUCATION/TRAINING PROGRAM

## 2024-10-17 RX ORDER — PROMETHAZINE HYDROCHLORIDE AND DEXTROMETHORPHAN HYDROBROMIDE 6.25; 15 MG/5ML; MG/5ML
5 SYRUP ORAL EVERY 6 HOURS PRN
Qty: 118 ML | Refills: 1 | Status: SHIPPED | OUTPATIENT
Start: 2024-10-17 | End: 2024-10-27

## 2024-10-17 RX ORDER — DOXYCYCLINE HYCLATE 100 MG
100 TABLET ORAL 2 TIMES DAILY
Qty: 14 TABLET | Refills: 0 | Status: SHIPPED | OUTPATIENT
Start: 2024-10-17 | End: 2024-10-24

## 2024-10-17 NOTE — PROGRESS NOTES
Patient ID: Aubrie Burks is a 24 y.o. female.    Chief Complaint: General Illness (Entered automatically based on patient selection in [employer].)          274}  The patient initiated a request through [employer] on 10/17/2024 for evaluation and management with a chief complaint of General Illness (Entered automatically based on patient selection in [employer].)     I evaluated the questionnaire submission on 10/17/2024 .    Total Time (in minutes): 13     [facility] Peq Evisit Supergroup-Cough And Cold    10/17/2024  7:58 AM CDT - Filed by Patient   What do you need help with? Cough, Cold, Sore Throat   Do you agree to participate in an E-Visit? Yes   If you have any of the following symptoms, go to your local emergency room or call 911: I acknowledge   Are you pregnant, could you be pregnant, or are you breast feeding? None of the above   What is the main issue you would like addressed today? Lingering cough   Do you think you might have COVID or the Flu? No   Have you tested positive for COVID or Flu? No   What symptoms do you currently have?  Cough   Describe your cough: Productive (containing mucus)   Describe the mucus: Green   Have you ever smoked? I have never smoked   Have you had a fever? No   When did your symptoms first appear? 2000   In the last two weeks, have you been in close contact with someone who has COVID-19 or the Flu? No   List what you have done or taken to help your symptoms. Nyquil & antibiotics   How severe are your symptoms? Mild   Have your symptoms gotten better or worse since they started?  No change   Do you have transportation to get testing if it is needed and ordered for you at an [facility] location? Yes   Provide any additional information you feel is important.    Please attach any relevant images or files    Are you able to take your vital signs? No          Active Problem List with Overview Notes    Diagnosis Date Noted    Generalized anxiety disorder 05/04/2023      Recent Labs  Obtained:  Lab Results   Component Value Date    TSH 0.643 08/09/2024      Review of patient's allergies indicates:  No Known Allergies    Encounter Diagnosis   Name Primary?    Subacute cough Yes        Orders Placed This Encounter   Procedures    X-Ray Chest PA And Lateral     Standing Status:   Future     Standing Expiration Date:   10/17/2025     Order Specific Question:   May the Radiologist modify the order per protocol to meet the clinical needs of the patient?     Answer:   Yes      Medications Ordered This Encounter   Medications    doxycycline (VIBRA-TABS) 100 MG tablet     Sig: Take 1 tablet (100 mg total) by mouth 2 (two) times daily. for 7 days     Dispense:  14 tablet     Refill:  0    promethazine-dextromethorphan (PROMETHAZINE-DM) 6.25-15 mg/5 mL Syrp     Sig: Take 5 mLs by mouth every 6 (six) hours as needed (cough).     Dispense:  118 mL     Refill:  1        E-Visit Time Tracking:    Day 1 Time (in minutes): 13    Total Time (in minutes): 13      274}

## 2024-10-17 NOTE — TELEPHONE ENCOUNTER
Patient states she saw Chelis last week. Has continued with cough. Sent the link for an e visit with Chelsi.

## 2024-10-17 NOTE — TELEPHONE ENCOUNTER
----- Message from Leena sent at 10/17/2024  7:13 AM CDT -----  Type:  Needs Medical Advice    Who Called:  Aubrie Burks  Symptoms (please be specific):  Lingering cough   How long has patient had these symptoms:   2 weeks  Pharmacy name and phone #:   [facility] #[id number] - [place], [place] - [address] [facility]   Phone: [phone]  Fax: [phone]  Would the patient rather a call back or a response via [facility]?  call  Best Call Back Number:   [phone]  Additional Information:  Pt is requesting medication for her cough.

## 2024-11-08 ENCOUNTER — OFFICE VISIT (OUTPATIENT)
Dept: FAMILY MEDICINE | Facility: CLINIC | Age: 24
End: 2024-11-08
Payer: COMMERCIAL

## 2024-11-08 VITALS
WEIGHT: 148.56 LBS | HEIGHT: 65 IN | DIASTOLIC BLOOD PRESSURE: 70 MMHG | BODY MASS INDEX: 24.75 KG/M2 | OXYGEN SATURATION: 98 % | SYSTOLIC BLOOD PRESSURE: 118 MMHG | HEART RATE: 78 BPM

## 2024-11-08 DIAGNOSIS — J30.9 ALLERGIC RHINITIS, UNSPECIFIED SEASONALITY, UNSPECIFIED TRIGGER: ICD-10-CM

## 2024-11-08 DIAGNOSIS — Z00.00 HEALTH CARE MAINTENANCE: ICD-10-CM

## 2024-11-08 DIAGNOSIS — F60.5 OBSESSIVE-COMPULSIVE PERSONALITY DISORDER: ICD-10-CM

## 2024-11-08 DIAGNOSIS — F41.1 GENERALIZED ANXIETY DISORDER: Primary | ICD-10-CM

## 2024-11-08 DIAGNOSIS — R35.0 URINARY FREQUENCY: ICD-10-CM

## 2024-11-08 LAB
BILIRUB UR QL STRIP: NEGATIVE
CLARITY UR REFRACT.AUTO: CLEAR
COLOR UR AUTO: COLORLESS
GLUCOSE UR QL STRIP: NEGATIVE
HGB UR QL STRIP: ABNORMAL
KETONES UR QL STRIP: NEGATIVE
LEUKOCYTE ESTERASE UR QL STRIP: NEGATIVE
MICROSCOPIC COMMENT: NORMAL
NITRITE UR QL STRIP: NEGATIVE
PH UR STRIP: 7 [PH] (ref 5–8)
PROT UR QL STRIP: NEGATIVE
RBC #/AREA URNS AUTO: 0 /HPF (ref 0–4)
SP GR UR STRIP: 1 (ref 1–1.03)
SQUAMOUS #/AREA URNS AUTO: 1 /HPF
URN SPEC COLLECT METH UR: ABNORMAL
WBC #/AREA URNS AUTO: 1 /HPF (ref 0–5)

## 2024-11-08 PROCEDURE — 3078F DIAST BP <80 MM HG: CPT | Mod: CPTII,S$GLB,, | Performed by: FAMILY MEDICINE

## 2024-11-08 PROCEDURE — 81001 URINALYSIS AUTO W/SCOPE: CPT | Performed by: FAMILY MEDICINE

## 2024-11-08 PROCEDURE — 99999 PR PBB SHADOW E&M-EST. PATIENT-LVL III: CPT | Mod: PBBFAC,,, | Performed by: FAMILY MEDICINE

## 2024-11-08 PROCEDURE — 3074F SYST BP LT 130 MM HG: CPT | Mod: CPTII,S$GLB,, | Performed by: FAMILY MEDICINE

## 2024-11-08 PROCEDURE — 99214 OFFICE O/P EST MOD 30 MIN: CPT | Mod: S$GLB,,, | Performed by: FAMILY MEDICINE

## 2024-11-08 PROCEDURE — 1159F MED LIST DOCD IN RCRD: CPT | Mod: CPTII,S$GLB,, | Performed by: FAMILY MEDICINE

## 2024-11-08 PROCEDURE — 1160F RVW MEDS BY RX/DR IN RCRD: CPT | Mod: CPTII,S$GLB,, | Performed by: FAMILY MEDICINE

## 2024-11-08 PROCEDURE — 3008F BODY MASS INDEX DOCD: CPT | Mod: CPTII,S$GLB,, | Performed by: FAMILY MEDICINE

## 2024-11-08 RX ORDER — CETIRIZINE HYDROCHLORIDE 10 MG/1
10 TABLET ORAL DAILY
Qty: 90 TABLET | Refills: 3 | Status: SHIPPED | OUTPATIENT
Start: 2024-11-08

## 2024-11-08 RX ORDER — FLUTICASONE PROPIONATE 50 MCG
2 SPRAY, SUSPENSION (ML) NASAL DAILY
Start: 2024-11-08

## 2024-11-11 NOTE — PROGRESS NOTES
.Subjective     Patient ID: Aubrie Burks is a 24 y.o. female.    Chief Complaint: Establish [facility]    HPI  Aubrie presents today for urinary frequency.    URINARY SYMPTOMS:  She reports frequent urination, particularly noticeable towards the end of the day, occurring approximately every 10 minutes. She also describes a sensation of incomplete bladder emptying after urination. She denies any pain, burning sensation, or urgency associated with urination, stating she can hold her urine when needed. These symptoms are occurring despite not increasing fluid intake.    RESPIRATORY:  She reports a persistent cough lingering for four months. An antibiotic was prescribed a month ago, which has improved the cough, but a mild, residual cough remains. She works in a  where RSV is circulating. She denies current use of Astelin or Flonase nasal spray, previous diagnosis of allergies, or experiencing typical allergy symptoms such as sneezing, itchy or watery eyes.    GYNECOLOGICAL HISTORY:  She reports monthly periods with recent irregularity. Her menstrual cycles typically last 5-7 days, with her most recent period lasting 10 days. She characterizes her flow as medium to moderate, denying any episodes of heavy bleeding. She mentions a recent ovarian cyst, which prompted medical evaluation.    MEDICAL HISTORY:  She has a history of anxiety and Obsessive Compulsive Disorder (OCD), both diagnosed around 2018. Her anxiety manifests with physical symptoms including vomiting, shakiness, and shortness of breath. She also reports a history of dyslexia.  Had Zoloft ordered but did not take med due to fear of s.e.    SOCIAL HISTORY:  She denies ever smoking.      ROS:  General: -fever, -chills, -fatigue, -weight gain, -weight loss  Eyes: -vision changes, -redness, -discharge  ENT: -ear pain, -nasal congestion, -sore throat  Cardiovascular: -chest pain, -palpitations, -lower extremity edema  Respiratory: +cough, +shortness of  breath  Gastrointestinal: -abdominal pain, -nausea, -vomiting, -diarrhea, -constipation, -blood in stool  Genitourinary: -dysuria, -hematuria, +frequency, -urgency  Musculoskeletal: -joint pain, -muscle pain  Skin: -rash, -lesion  Neurological: -headache, -dizziness, -numbness, -tingling  Psychiatric: -anxiety, -depression, -sleep difficulty  Allergic: -frequent sneezing            Objective     Vitals:    11/08/24 1505   BP: 118/70   Pulse: 78        Current Outpatient Medications   Medication Sig Dispense Refill    cetirizine (ZYRTEC) 10 MG tablet Take 1 tablet (10 mg total) by mouth once daily. 90 tablet 3    fluticasone propionate (FLONASE) 50 mcg/actuation nasal spray 2 sprays (100 mcg total) by Each Nostril route once daily.       No current facility-administered medications for this visit.        Physical Exam    General: No acute distress. Well-developed. Well-nourished.  Eyes: EOMI. Sclerae anicteric.  HENT: Normocephalic. Atraumatic. Nares patent. Moist oral mucosa. Clear mucus draining down the back of the throat. Clear nasal passages with pale pink swollen turbinates.  Ears: Bilateral TMs clear. Bilateral EACs clear.  Cardiovascular: Regular rate. Regular rhythm. No murmurs. No rubs. No gallops. Normal S1, S2.  Respiratory: Normal respiratory effort. Clear to auscultation bilaterally. No rales. No rhonchi. No wheezing.  Abdomen: Soft. Non-tender. Non-distended. Normoactive bowel sounds.  Musculoskeletal: No  obvious deformity.  Extremities: No lower extremity edema.  Neurological: Alert & oriented x3. No slurred speech. Normal gait.  Psychiatric: Normal mood. Normal affect. Good insight. Good judgment.  Skin: Warm. Dry. No rash.  Neck: No lymphadenopathy.            Assessment and Plan     Generalized anxiety disorder          - Teaching reviewed on MDD including coping skills, safety plan and treatment options.  Declines starting medication.  Declines offer for counseling.     Obsessive-compulsive  personality disorder       - Teaching reviewed on OCD including coping skills, safety plan and treatment options.  Declines starting medication.  Declines offer for counseling.   Allergic rhinitis, unspecified seasonality, unspecified trigger  -     fluticasone propionate (FLONASE) 50 mcg/actuation nasal spray; 2 sprays (100 mcg total) by Each Nostril route once daily.  -     cetirizine (ZYRTEC) 10 MG tablet; Take 1 tablet (10 mg total) by mouth once daily.  Dispense: 90 tablet; Refill: 3    Urinary frequency  -     POCT URINE DIPSTICK WITHOUT MICROSCOPE  -     Urinalysis, Reflex to Urine Culture Urine, Clean Catch; Future; Expected date: 11/08/2024    Health care maintenance  -     Comprehensive Metabolic Panel; Future; Expected date: 11/08/2024  -     Hemoglobin A1C; Future; Expected date: 11/08/2024  -     Lipid Panel; Future; Expected date: 11/08/2024  -     Cancel: Urinalysis, Reflex to Urine Culture Urine, Clean Catch; Future; Expected date: 11/08/2024  -     CBC Auto Differential; Future; Expected date: 11/08/2024    Other orders  -     Urinalysis Microscopic       Assessment & Plan    Diagnosed patient with allergies based on symptoms and physical exam findings  Determined patient's cough likely due to post-nasal drip from allergies rather than infection  Assessed urinary frequency symptoms; ordered urinalysis to rule out UTI  Reviewed vaccination status and recommended HPV and COVID vaccines    ALLERGIES:  Explained allergies can develop with age and may worsen seasonally.  Described proper nasal spray technique to avoid tasting medication.  Started Flonase nasal spray: 2 sprays each nostril daily.  Started Zyrtec: take orally daily for allergy symptoms.  Aubrie to use proper nasal spray technique: tip spray towards cheek after insertion to avoid tasting medication.    ANXIETY/OCD:  Discussed safety profile of Zoloft for potential future anxiety/OCD treatment.  Recommend bringing support person for  upcoming blood draw due to anxiety.    HPV VACCINATION:  Educated on HPV vaccine's role in preventing cervical cancer and genital warts.    COVID-19 VACCINATION:  Explained importance of COVID vaccination for personal and community protection.    LABS:  Point of care urinalysis dip test performed.  Comprehensive metabolic panel, lipid panel, thyroid panel, hemoglobin A1C, and CBC ordered: to be completed on 25th after fasting.  Urinalysis with reflexive culture ordered: to be sent to lab for confirmation.  Fast after midnight the night before lab work, but continue to drink water.    FOLLOW UP:  Follow up on Monday, 25th for lab work.  Results will be available in patient portal; contact office if any questions arise about lab results or to schedule review appointment.       Follow up as needed    This note was generated with the assistance of ambient listening technology. Verbal consent was obtained by the patient and accompanying visitor(s) for the recording of patient appointment to facilitate this note. I attest to having reviewed and edited the generated note for accuracy, though some syntax or spelling errors may persist. Please contact the author of this note for any clarification.

## 2024-11-26 ENCOUNTER — LAB VISIT (OUTPATIENT)
Dept: LAB | Facility: HOSPITAL | Age: 24
End: 2024-11-26
Attending: FAMILY MEDICINE
Payer: COMMERCIAL

## 2024-11-26 DIAGNOSIS — Z00.00 HEALTH CARE MAINTENANCE: ICD-10-CM

## 2024-11-26 LAB
ALBUMIN SERPL BCP-MCNC: 4.2 G/DL (ref 3.5–5.2)
ALP SERPL-CCNC: 59 U/L (ref 40–150)
ALT SERPL W/O P-5'-P-CCNC: 13 U/L (ref 10–44)
ANION GAP SERPL CALC-SCNC: 8 MMOL/L (ref 8–16)
AST SERPL-CCNC: 17 U/L (ref 10–40)
BASOPHILS # BLD AUTO: 0.04 K/UL (ref 0–0.2)
BASOPHILS NFR BLD: 0.7 % (ref 0–1.9)
BILIRUB SERPL-MCNC: 0.3 MG/DL (ref 0.1–1)
BUN SERPL-MCNC: 10 MG/DL (ref 6–20)
CALCIUM SERPL-MCNC: 9.5 MG/DL (ref 8.7–10.5)
CHLORIDE SERPL-SCNC: 106 MMOL/L (ref 95–110)
CHOLEST SERPL-MCNC: 182 MG/DL (ref 120–199)
CHOLEST/HDLC SERPL: 3.4 {RATIO} (ref 2–5)
CO2 SERPL-SCNC: 24 MMOL/L (ref 23–29)
CREAT SERPL-MCNC: 0.9 MG/DL (ref 0.5–1.4)
DIFFERENTIAL METHOD BLD: NORMAL
EOSINOPHIL # BLD AUTO: 0.1 K/UL (ref 0–0.5)
EOSINOPHIL NFR BLD: 2.5 % (ref 0–8)
ERYTHROCYTE [DISTWIDTH] IN BLOOD BY AUTOMATED COUNT: 12.3 % (ref 11.5–14.5)
EST. GFR  (NO RACE VARIABLE): >60 ML/MIN/1.73 M^2
ESTIMATED AVG GLUCOSE: 108 MG/DL (ref 68–131)
GLUCOSE SERPL-MCNC: 93 MG/DL (ref 70–110)
HBA1C MFR BLD: 5.4 % (ref 4–5.6)
HCT VFR BLD AUTO: 40 % (ref 37–48.5)
HDLC SERPL-MCNC: 53 MG/DL (ref 40–75)
HDLC SERPL: 29.1 % (ref 20–50)
HGB BLD-MCNC: 13.1 G/DL (ref 12–16)
IMM GRANULOCYTES # BLD AUTO: 0.02 K/UL (ref 0–0.04)
IMM GRANULOCYTES NFR BLD AUTO: 0.4 % (ref 0–0.5)
LDLC SERPL CALC-MCNC: 115.2 MG/DL (ref 63–159)
LYMPHOCYTES # BLD AUTO: 2.4 K/UL (ref 1–4.8)
LYMPHOCYTES NFR BLD: 42.5 % (ref 18–48)
MCH RBC QN AUTO: 29.4 PG (ref 27–31)
MCHC RBC AUTO-ENTMCNC: 32.8 G/DL (ref 32–36)
MCV RBC AUTO: 90 FL (ref 82–98)
MONOCYTES # BLD AUTO: 0.3 K/UL (ref 0.3–1)
MONOCYTES NFR BLD: 4.9 % (ref 4–15)
NEUTROPHILS # BLD AUTO: 2.8 K/UL (ref 1.8–7.7)
NEUTROPHILS NFR BLD: 49 % (ref 38–73)
NONHDLC SERPL-MCNC: 129 MG/DL
NRBC BLD-RTO: 0 /100 WBC
PLATELET # BLD AUTO: 299 K/UL (ref 150–450)
PMV BLD AUTO: 10.5 FL (ref 9.2–12.9)
POTASSIUM SERPL-SCNC: 3.8 MMOL/L (ref 3.5–5.1)
PROT SERPL-MCNC: 7.5 G/DL (ref 6–8.4)
RBC # BLD AUTO: 4.46 M/UL (ref 4–5.4)
SODIUM SERPL-SCNC: 138 MMOL/L (ref 136–145)
TRIGL SERPL-MCNC: 69 MG/DL (ref 30–150)
WBC # BLD AUTO: 5.69 K/UL (ref 3.9–12.7)

## 2024-11-26 PROCEDURE — 80061 LIPID PANEL: CPT | Performed by: FAMILY MEDICINE

## 2024-11-26 PROCEDURE — 85025 COMPLETE CBC W/AUTO DIFF WBC: CPT | Performed by: FAMILY MEDICINE

## 2024-11-26 PROCEDURE — 83036 HEMOGLOBIN GLYCOSYLATED A1C: CPT | Performed by: FAMILY MEDICINE

## 2024-11-26 PROCEDURE — 80053 COMPREHEN METABOLIC PANEL: CPT | Performed by: FAMILY MEDICINE

## 2025-06-25 ENCOUNTER — PATIENT MESSAGE (OUTPATIENT)
Dept: FAMILY MEDICINE | Facility: CLINIC | Age: 25
End: 2025-06-25
Payer: COMMERCIAL

## 2025-06-27 ENCOUNTER — OFFICE VISIT (OUTPATIENT)
Dept: FAMILY MEDICINE | Facility: CLINIC | Age: 25
End: 2025-06-27
Payer: COMMERCIAL

## 2025-06-27 VITALS
SYSTOLIC BLOOD PRESSURE: 126 MMHG | HEIGHT: 65 IN | HEART RATE: 100 BPM | BODY MASS INDEX: 25.13 KG/M2 | DIASTOLIC BLOOD PRESSURE: 68 MMHG | WEIGHT: 150.81 LBS | OXYGEN SATURATION: 98 %

## 2025-06-27 DIAGNOSIS — Z00.00 HEALTHCARE MAINTENANCE: ICD-10-CM

## 2025-06-27 DIAGNOSIS — F60.5 OBSESSIVE-COMPULSIVE PERSONALITY DISORDER: ICD-10-CM

## 2025-06-27 DIAGNOSIS — F41.1 GENERALIZED ANXIETY DISORDER: Primary | ICD-10-CM

## 2025-06-27 DIAGNOSIS — J30.9 CHRONIC ALLERGIC RHINITIS: ICD-10-CM

## 2025-06-27 PROCEDURE — 99999 PR PBB SHADOW E&M-EST. PATIENT-LVL III: CPT | Mod: PBBFAC,,, | Performed by: FAMILY MEDICINE

## 2025-06-27 RX ORDER — SERTRALINE HYDROCHLORIDE 25 MG/1
25 TABLET, FILM COATED ORAL DAILY
Qty: 30 TABLET | Refills: 1 | Status: SHIPPED | OUTPATIENT
Start: 2025-06-27

## 2025-06-27 NOTE — PROGRESS NOTES
Subjective     Patient ID: Aubrie Burks is a 24 y.o. female.    Chief Complaint: PATRICIA    HPI  History of Present Illness    CHIEF COMPLAINT:  Aubrie presents today for anxiety and OCD management.    ANXIETY AND OCD:  She reports a long-standing history of anxiety and OCD dating back to middle school.   She experiences daily anxiety, though not consistently severe in intensity. 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   She also reports anticipatory anxiety about an upcoming flight in November. Despite persistent symptoms, she has never been on daily anxiety medication but has engaged in counseling on several occasions in the past.   She was prescribed Zoloft in the past and picked up the prescription but never initiated treatment.    MEDICATION CONCERNS:  She expresses significant concerns about starting daily anxiety medication, specifically fears about physical dependency and potential interactions with alcohol. She appears hesitant about starting pharmacological treatment despite having a prescription for Zoloft.    CHRONIC ALLERGIC RHINITIS:  She is currently have AR symptoms but not currently using her AR medication.  Reports good response when she does use her AR medication of Flonase mainly.    ROS:  General: -fever, -chills, -fatigue, -weight gain, -weight loss  Eyes: -vision changes, -redness, -discharge  ENT: -ear pain, -nasal congestion, -sore throat  Cardiovascular: -chest pain, -palpitations, -lower extremity edema  Respiratory: +cough, -shortness of breath  Gastrointestinal: -abdominal pain, -nausea, -vomiting, -diarrhea, -constipation, -blood in stool  Genitourinary: -dysuria, -hematuria, -frequency  Musculoskeletal: -joint pain, -muscle pain  Skin: -rash, -lesion  Neurological: -headache, -dizziness, -numbness, -tingling  Psychiatric: +anxiety, -depression, -sleep difficulty,  +social anxiety, +compulsions, +obsessive behavior, +obsessive thoughts  Allergic: +seasonal allergies          Objective     Vitals:    06/27/25 0832   BP: 126/68   Pulse: 100        Current Medications[1]     Physical Exam    General: No acute distress. Well-developed. Well-nourished.  Eyes: EOMI. Sclerae anicteric.  HENT: Normocephalic. Atraumatic. Nares patent. Moist oral mucosa.  Cardiovascular: Regular rate. Regular rhythm. No murmurs. No rubs. No gallops. Normal S1, S2.  Respiratory: Normal respiratory effort. Clear to auscultation bilaterally. No rales. No rhonchi. No wheezing.  Abdomen: Soft. Non-tender. Non-distended. Normoactive bowel sounds.  Musculoskeletal: No  obvious deformity.  Extremities: No lower extremity edema.  Neurological: Alert & oriented x3. No slurred speech. Normal gait.  Psychiatric: Normal mood. Normal affect. Good insight. Good judgment.  Skin: Warm. Dry. No rash.      Assessment and Plan     Generalized anxiety disorder  - Teaching reviewed on MDD including coping skills, safety plan and treatment options.  Start Zoloft 25mg by mouth daily.  Declines offer for counseling.  -     sertraline (ZOLOFT) 25 MG tablet; Take 1 tablet (25 mg total) by mouth once daily.  Dispense: 30 tablet; Refill: 1    Obsessive-compulsive personality disorder  - Teaching reviewed on OCD including coping skills, safety plan and treatment options.  Start Zoloft 25mg by mouth daily.  Declines offer for counseling.  -     sertraline (ZOLOFT) 25 MG tablet; Take 1 tablet (25 mg total) by mouth once daily.  Dispense: 30 tablet; Refill: 1    Chronic allergic rhinitis        - Teaching on AR including avoiding triggers and treatment options.  Restart Flonase daily.  Will start Zyrtec, Singular and/or saline sinus rinses if needed.     Healthcare maintenance  -     CBC Auto Differential; Future; Expected date: 12/27/2025  -     Comprehensive Metabolic Panel; Future; Expected date: 12/27/2025  -     Hemoglobin A1C;  Future; Expected date: 12/27/2025  -     Lipid Panel; Future; Expected date: 12/27/2025  -     TSH; Future; Expected date: 12/27/2025  -     Urinalysis, Reflex to Urine Culture Urine, Clean Catch; Future; Expected date: 12/27/2025    Assessment & Plan    PLAN SUMMARY:  - Prescribed Zoloft 25 mg daily with 30-day supply and 1 refill  - Consider prescribing as-needed anxiolytic pending response to daily SSRI therapy  - Potential use of Xanax instead of Klonopin for specific anxiety-provoking events, such as flights  - Medication can be taken either in morning or at bedtime  - Follow-up in 4-6 weeks (end of July) to reassess efficacy and tolerability, determine if dose adjustment is needed, and evaluate response for both anxiety and OCD symptoms    GENERAL ANXIETY DISORDER:  - Evaluated patient's anxiety symptoms which have persisted daily since middle school (approximately 10 years) with varying intensity.  - Discussed the nature of anxiety and neurotransmitter imbalance.  - Prescribed Zoloft 25 mg daily, selected over other SSRIs due to family history of positive response.  - Provided 30-day supply with 1 refill.  - Medication can be taken either in morning or at bedtime.  - Explained SSRI mechanism of action in regulating neurotransmitters to manage anxiety symptoms.  - Addressed concerns about medication dependence, emphasizing that SSRIs are not addictive but work by allowing the brain to regulate its own chemicals.  - Clarified that moderate alcohol consumption is generally acceptable with SSRIs.  - Discussed differences between daily anxiety medications (SSRIs) and as-needed anxiolytics (e.g., benzodiazepines) regarding onset, duration, and addiction potential.  - Instructed patient to contact office for concerning side effects before scheduled follow-up.  - Will reassess efficacy and tolerability in 4-6 weeks (end of July) to determine if dose adjustment is needed.    OBSESSIVE-COMPULSIVE DISORDER:  - Evaluated  OCD symptoms which have also persisted since middle school.  - The prescribed Zoloft 25 mg daily should address both anxiety and OCD symptoms through the same mechanism of neurotransmitter regulation.  - Will reassess efficacy for OCD symptoms during the follow up in 4-6 weeks.    SITUATIONAL ANXIETY:  - Monitored patient's anxiety in specific situations such as parent-teacher conferences and certain work events.  - Discussed potential use of as-needed medication for situational anxiety, such as Xanax for specific anxiety-provoking events.    FEAR OF FLYING:  - Monitored anxiety related to upcoming flight in November.  - Discussed using Xanax instead of Klonopin for flights due to Klonopin's more addictive nature and side effect profile.  - Will consider prescribing as-needed anxiolytic pending response to daily SSRI therapy.              No follow-ups on file.    This note was generated with the assistance of ambient listening technology. Verbal consent was obtained by the patient and accompanying visitor(s) for the recording of patient appointment to facilitate this note. I attest to having reviewed and edited the generated note for accuracy, though some syntax or spelling errors may persist. Please contact the author of this note for any clarification.         [1]   Current Outpatient Medications   Medication Sig Dispense Refill    cetirizine (ZYRTEC) 10 MG tablet Take 1 tablet (10 mg total) by mouth once daily. (Patient not taking: Reported on 6/27/2025) 90 tablet 3    fluticasone propionate (FLONASE) 50 mcg/actuation nasal spray 2 sprays (100 mcg total) by Each Nostril route once daily. (Patient not taking: Reported on 6/27/2025)      sertraline (ZOLOFT) 25 MG tablet Take 1 tablet (25 mg total) by mouth once daily. 30 tablet 1     No current facility-administered medications for this visit.

## 2025-07-14 ENCOUNTER — TELEPHONE (OUTPATIENT)
Dept: PRIMARY CARE CLINIC | Facility: CLINIC | Age: 25
End: 2025-07-14
Payer: COMMERCIAL

## 2025-07-14 NOTE — TELEPHONE ENCOUNTER
Copied from CRM #[id number]. Topic: Appointments - Appointment Scheduling  >> Jul 12, 2025 11:43 AM Neisha wrote:  Type: Appointment Request    Caller is requesting an appointment for a new patient.    Name of Caller: Pt  Reason for appointment: [facility]  Would the patient rather a call back or a response via [facility]? Call back  Best Call Back Number:   [phone]               Additional Information: Please call, Thank You